# Patient Record
Sex: MALE | Race: WHITE | NOT HISPANIC OR LATINO | Employment: FULL TIME | URBAN - NONMETROPOLITAN AREA
[De-identification: names, ages, dates, MRNs, and addresses within clinical notes are randomized per-mention and may not be internally consistent; named-entity substitution may affect disease eponyms.]

---

## 2017-10-20 ENCOUNTER — ANESTHESIA (OUTPATIENT)
Dept: SURGERY | Facility: HOSPITAL | Age: 42
End: 2017-10-20
Payer: COMMERCIAL

## 2017-10-20 ENCOUNTER — HOSPITAL ENCOUNTER (OUTPATIENT)
Facility: HOSPITAL | Age: 42
Discharge: HOME | End: 2017-10-21
Attending: EMERGENCY MEDICINE | Admitting: SURGERY
Payer: COMMERCIAL

## 2017-10-20 ENCOUNTER — ANESTHESIA EVENT (OUTPATIENT)
Dept: SURGERY | Facility: HOSPITAL | Age: 42
End: 2017-10-20
Payer: COMMERCIAL

## 2017-10-20 ENCOUNTER — APPOINTMENT (OUTPATIENT)
Dept: GENERAL RADIOLOGY | Facility: HOSPITAL | Age: 42
End: 2017-10-20
Attending: SURGERY
Payer: COMMERCIAL

## 2017-10-20 ENCOUNTER — APPOINTMENT (OUTPATIENT)
Dept: GENERAL RADIOLOGY | Facility: HOSPITAL | Age: 42
End: 2017-10-20
Attending: EMERGENCY MEDICINE
Payer: COMMERCIAL

## 2017-10-20 ENCOUNTER — SURGERY (OUTPATIENT)
Age: 42
End: 2017-10-20

## 2017-10-20 DIAGNOSIS — S91.312A FOOT LACERATION, LEFT, INITIAL ENCOUNTER: Primary | ICD-10-CM

## 2017-10-20 DIAGNOSIS — T14.8XXA: ICD-10-CM

## 2017-10-20 LAB
ANION GAP SERPL CALCULATED.3IONS-SCNC: 9 MMOL/L (ref 3–14)
BASOPHILS # BLD AUTO: 0 10E9/L (ref 0–0.2)
BASOPHILS NFR BLD AUTO: 0.2 %
BUN SERPL-MCNC: 13 MG/DL (ref 7–30)
CALCIUM SERPL-MCNC: 9.1 MG/DL (ref 8.5–10.1)
CHLORIDE SERPL-SCNC: 103 MMOL/L (ref 94–109)
CO2 SERPL-SCNC: 27 MMOL/L (ref 20–32)
CREAT SERPL-MCNC: 1 MG/DL (ref 0.66–1.25)
DIFFERENTIAL METHOD BLD: ABNORMAL
EOSINOPHIL # BLD AUTO: 0.2 10E9/L (ref 0–0.7)
EOSINOPHIL NFR BLD AUTO: 1.7 %
ERYTHROCYTE [DISTWIDTH] IN BLOOD BY AUTOMATED COUNT: 12.1 % (ref 10–15)
GFR SERPL CREATININE-BSD FRML MDRD: 82 ML/MIN/1.7M2
GLUCOSE SERPL-MCNC: 106 MG/DL (ref 70–99)
HCT VFR BLD AUTO: 41.6 % (ref 40–53)
HGB BLD-MCNC: 14.6 G/DL (ref 13.3–17.7)
IMM GRANULOCYTES # BLD: 0 10E9/L (ref 0–0.4)
IMM GRANULOCYTES NFR BLD: 0.3 %
LYMPHOCYTES # BLD AUTO: 1.9 10E9/L (ref 0.8–5.3)
LYMPHOCYTES NFR BLD AUTO: 15.9 %
MCH RBC QN AUTO: 31.5 PG (ref 26.5–33)
MCHC RBC AUTO-ENTMCNC: 35.1 G/DL (ref 31.5–36.5)
MCV RBC AUTO: 90 FL (ref 78–100)
MONOCYTES # BLD AUTO: 1 10E9/L (ref 0–1.3)
MONOCYTES NFR BLD AUTO: 8.4 %
NEUTROPHILS # BLD AUTO: 8.7 10E9/L (ref 1.6–8.3)
NEUTROPHILS NFR BLD AUTO: 73.5 %
NRBC # BLD AUTO: 0 10*3/UL
NRBC BLD AUTO-RTO: 0 /100
PLATELET # BLD AUTO: 273 10E9/L (ref 150–450)
POTASSIUM SERPL-SCNC: 3.8 MMOL/L (ref 3.4–5.3)
RBC # BLD AUTO: 4.63 10E12/L (ref 4.4–5.9)
SODIUM SERPL-SCNC: 139 MMOL/L (ref 133–144)
WBC # BLD AUTO: 11.8 10E9/L (ref 4–11)

## 2017-10-20 PROCEDURE — 36415 COLL VENOUS BLD VENIPUNCTURE: CPT | Performed by: EMERGENCY MEDICINE

## 2017-10-20 PROCEDURE — 37000008 ZZH ANESTHESIA TECHNICAL FEE, 1ST 30 MIN: Performed by: SURGERY

## 2017-10-20 PROCEDURE — 27110028 ZZH OR GENERAL SUPPLY NON-STERILE: Performed by: SURGERY

## 2017-10-20 PROCEDURE — 25000125 ZZHC RX 250: Performed by: SURGERY

## 2017-10-20 PROCEDURE — 25000125 ZZHC RX 250: Performed by: NURSE ANESTHETIST, CERTIFIED REGISTERED

## 2017-10-20 PROCEDURE — 40000985 XR FOOT LT G/E 3 VW: Mod: TC,LT

## 2017-10-20 PROCEDURE — S0020 INJECTION, BUPIVICAINE HYDRO: HCPCS | Performed by: SURGERY

## 2017-10-20 PROCEDURE — 73630 X-RAY EXAM OF FOOT: CPT | Mod: TC,LT

## 2017-10-20 PROCEDURE — 99285 EMERGENCY DEPT VISIT HI MDM: CPT | Mod: 25

## 2017-10-20 PROCEDURE — 28208 REPAIR OF FOOT TENDON: CPT | Performed by: SURGERY

## 2017-10-20 PROCEDURE — 25000132 ZZH RX MED GY IP 250 OP 250 PS 637: Performed by: SURGERY

## 2017-10-20 PROCEDURE — 71000014 ZZH RECOVERY PHASE 1 LEVEL 2 FIRST HR: Performed by: SURGERY

## 2017-10-20 PROCEDURE — 40000275 ZZH STATISTIC RCP TIME EA 10 MIN

## 2017-10-20 PROCEDURE — 36000052 ZZH SURGERY LEVEL 2 EA 15 ADDTL MIN: Performed by: SURGERY

## 2017-10-20 PROCEDURE — 25000128 H RX IP 250 OP 636: Performed by: NURSE ANESTHETIST, CERTIFIED REGISTERED

## 2017-10-20 PROCEDURE — 25000128 H RX IP 250 OP 636: Performed by: SURGERY

## 2017-10-20 PROCEDURE — 37000009 ZZH ANESTHESIA TECHNICAL FEE, EACH ADDTL 15 MIN: Performed by: SURGERY

## 2017-10-20 PROCEDURE — 85025 COMPLETE CBC W/AUTO DIFF WBC: CPT | Performed by: EMERGENCY MEDICINE

## 2017-10-20 PROCEDURE — 11044 DBRDMT BONE 1ST 20 SQ CM/<: CPT | Performed by: SURGERY

## 2017-10-20 PROCEDURE — 99283 EMERGENCY DEPT VISIT LOW MDM: CPT | Performed by: EMERGENCY MEDICINE

## 2017-10-20 PROCEDURE — 27210794 ZZH OR GENERAL SUPPLY STERILE: Performed by: SURGERY

## 2017-10-20 PROCEDURE — 80048 BASIC METABOLIC PNL TOTAL CA: CPT | Performed by: EMERGENCY MEDICINE

## 2017-10-20 PROCEDURE — 01999 UNLISTED ANES PROCEDURE: CPT | Performed by: NURSE ANESTHETIST, CERTIFIED REGISTERED

## 2017-10-20 PROCEDURE — 96372 THER/PROPH/DIAG INJ SC/IM: CPT

## 2017-10-20 PROCEDURE — 25000128 H RX IP 250 OP 636

## 2017-10-20 PROCEDURE — 36000050 ZZH SURGERY LEVEL 2 1ST 30 MIN: Performed by: SURGERY

## 2017-10-20 PROCEDURE — 12001 RPR S/N/AX/GEN/TRNK 2.5CM/<: CPT | Mod: 59 | Performed by: SURGERY

## 2017-10-20 RX ORDER — ACETAMINOPHEN 325 MG/1
650 TABLET ORAL EVERY 6 HOURS PRN
Status: DISCONTINUED | OUTPATIENT
Start: 2017-10-20 | End: 2017-10-21 | Stop reason: HOSPADM

## 2017-10-20 RX ORDER — CEFAZOLIN SODIUM 1 G/3ML
INJECTION, POWDER, FOR SOLUTION INTRAMUSCULAR; INTRAVENOUS PRN
Status: DISCONTINUED | OUTPATIENT
Start: 2017-10-20 | End: 2017-10-20

## 2017-10-20 RX ORDER — KETOROLAC TROMETHAMINE 30 MG/ML
INJECTION, SOLUTION INTRAMUSCULAR; INTRAVENOUS
Status: COMPLETED
Start: 2017-10-20 | End: 2017-10-20

## 2017-10-20 RX ORDER — ONDANSETRON 4 MG/1
4 TABLET, ORALLY DISINTEGRATING ORAL EVERY 30 MIN PRN
Status: DISCONTINUED | OUTPATIENT
Start: 2017-10-20 | End: 2017-10-20

## 2017-10-20 RX ORDER — SODIUM CHLORIDE, SODIUM LACTATE, POTASSIUM CHLORIDE, CALCIUM CHLORIDE 600; 310; 30; 20 MG/100ML; MG/100ML; MG/100ML; MG/100ML
INJECTION, SOLUTION INTRAVENOUS CONTINUOUS
Status: DISCONTINUED | OUTPATIENT
Start: 2017-10-20 | End: 2017-10-20

## 2017-10-20 RX ORDER — MEPERIDINE HYDROCHLORIDE 25 MG/ML
12.5 INJECTION INTRAMUSCULAR; INTRAVENOUS; SUBCUTANEOUS
Status: DISCONTINUED | OUTPATIENT
Start: 2017-10-20 | End: 2017-10-20 | Stop reason: HOSPADM

## 2017-10-20 RX ORDER — FENTANYL CITRATE 50 UG/ML
25-50 INJECTION, SOLUTION INTRAMUSCULAR; INTRAVENOUS
Status: DISCONTINUED | OUTPATIENT
Start: 2017-10-20 | End: 2017-10-20 | Stop reason: HOSPADM

## 2017-10-20 RX ORDER — NALOXONE HYDROCHLORIDE 0.4 MG/ML
.1-.4 INJECTION, SOLUTION INTRAMUSCULAR; INTRAVENOUS; SUBCUTANEOUS
Status: DISCONTINUED | OUTPATIENT
Start: 2017-10-20 | End: 2017-10-21 | Stop reason: HOSPADM

## 2017-10-20 RX ORDER — LABETALOL HYDROCHLORIDE 5 MG/ML
10 INJECTION, SOLUTION INTRAVENOUS
Status: DISCONTINUED | OUTPATIENT
Start: 2017-10-20 | End: 2017-10-20 | Stop reason: HOSPADM

## 2017-10-20 RX ORDER — MORPHINE SULFATE 2 MG/ML
2-4 INJECTION, SOLUTION INTRAMUSCULAR; INTRAVENOUS EVERY 10 MIN PRN
Status: DISCONTINUED | OUTPATIENT
Start: 2017-10-20 | End: 2017-10-20 | Stop reason: HOSPADM

## 2017-10-20 RX ORDER — METOCLOPRAMIDE 10 MG/1
10 TABLET ORAL EVERY 6 HOURS PRN
Status: DISCONTINUED | OUTPATIENT
Start: 2017-10-20 | End: 2017-10-21 | Stop reason: HOSPADM

## 2017-10-20 RX ORDER — DEXAMETHASONE SODIUM PHOSPHATE 4 MG/ML
4 INJECTION, SOLUTION INTRA-ARTICULAR; INTRALESIONAL; INTRAMUSCULAR; INTRAVENOUS; SOFT TISSUE
Status: DISCONTINUED | OUTPATIENT
Start: 2017-10-20 | End: 2017-10-20

## 2017-10-20 RX ORDER — PROPOFOL 10 MG/ML
INJECTION, EMULSION INTRAVENOUS PRN
Status: DISCONTINUED | OUTPATIENT
Start: 2017-10-20 | End: 2017-10-20

## 2017-10-20 RX ORDER — FENTANYL CITRATE 50 UG/ML
INJECTION, SOLUTION INTRAMUSCULAR; INTRAVENOUS PRN
Status: DISCONTINUED | OUTPATIENT
Start: 2017-10-20 | End: 2017-10-20

## 2017-10-20 RX ORDER — NALOXONE HYDROCHLORIDE 0.4 MG/ML
.1-.4 INJECTION, SOLUTION INTRAMUSCULAR; INTRAVENOUS; SUBCUTANEOUS
Status: DISCONTINUED | OUTPATIENT
Start: 2017-10-20 | End: 2017-10-20 | Stop reason: HOSPADM

## 2017-10-20 RX ORDER — MORPHINE SULFATE 2 MG/ML
2-4 INJECTION, SOLUTION INTRAMUSCULAR; INTRAVENOUS EVERY 5 MIN PRN
Status: DISCONTINUED | OUTPATIENT
Start: 2017-10-20 | End: 2017-10-20

## 2017-10-20 RX ORDER — ONDANSETRON 2 MG/ML
4 INJECTION INTRAMUSCULAR; INTRAVENOUS EVERY 30 MIN PRN
Status: DISCONTINUED | OUTPATIENT
Start: 2017-10-20 | End: 2017-10-20

## 2017-10-20 RX ORDER — ONDANSETRON 4 MG/1
4 TABLET, ORALLY DISINTEGRATING ORAL EVERY 30 MIN PRN
Status: DISCONTINUED | OUTPATIENT
Start: 2017-10-20 | End: 2017-10-20 | Stop reason: HOSPADM

## 2017-10-20 RX ORDER — OXYCODONE HYDROCHLORIDE 5 MG/1
5 TABLET ORAL EVERY 4 HOURS PRN
Status: DISCONTINUED | OUTPATIENT
Start: 2017-10-20 | End: 2017-10-21 | Stop reason: HOSPADM

## 2017-10-20 RX ORDER — ONDANSETRON 4 MG/1
4 TABLET, ORALLY DISINTEGRATING ORAL EVERY 6 HOURS PRN
Status: DISCONTINUED | OUTPATIENT
Start: 2017-10-20 | End: 2017-10-21 | Stop reason: HOSPADM

## 2017-10-20 RX ORDER — SODIUM CHLORIDE, SODIUM LACTATE, POTASSIUM CHLORIDE, CALCIUM CHLORIDE 600; 310; 30; 20 MG/100ML; MG/100ML; MG/100ML; MG/100ML
INJECTION, SOLUTION INTRAVENOUS CONTINUOUS PRN
Status: DISCONTINUED | OUTPATIENT
Start: 2017-10-20 | End: 2017-10-20

## 2017-10-20 RX ORDER — BUPIVACAINE HYDROCHLORIDE 2.5 MG/ML
INJECTION, SOLUTION INFILTRATION; PERINEURAL PRN
Status: DISCONTINUED | OUTPATIENT
Start: 2017-10-20 | End: 2017-10-20 | Stop reason: HOSPADM

## 2017-10-20 RX ORDER — PROCHLORPERAZINE MALEATE 5 MG
5-10 TABLET ORAL EVERY 6 HOURS PRN
Status: DISCONTINUED | OUTPATIENT
Start: 2017-10-20 | End: 2017-10-21 | Stop reason: HOSPADM

## 2017-10-20 RX ORDER — METOCLOPRAMIDE HYDROCHLORIDE 5 MG/ML
10 INJECTION INTRAMUSCULAR; INTRAVENOUS EVERY 6 HOURS PRN
Status: DISCONTINUED | OUTPATIENT
Start: 2017-10-20 | End: 2017-10-21 | Stop reason: HOSPADM

## 2017-10-20 RX ORDER — ONDANSETRON 2 MG/ML
4 INJECTION INTRAMUSCULAR; INTRAVENOUS EVERY 6 HOURS PRN
Status: DISCONTINUED | OUTPATIENT
Start: 2017-10-20 | End: 2017-10-21 | Stop reason: HOSPADM

## 2017-10-20 RX ORDER — ONDANSETRON 2 MG/ML
4 INJECTION INTRAMUSCULAR; INTRAVENOUS EVERY 30 MIN PRN
Status: DISCONTINUED | OUTPATIENT
Start: 2017-10-20 | End: 2017-10-20 | Stop reason: HOSPADM

## 2017-10-20 RX ORDER — KETOROLAC TROMETHAMINE 30 MG/ML
30 INJECTION, SOLUTION INTRAMUSCULAR; INTRAVENOUS ONCE
Status: COMPLETED | OUTPATIENT
Start: 2017-10-20 | End: 2017-10-20

## 2017-10-20 RX ORDER — PROPOFOL 10 MG/ML
INJECTION, EMULSION INTRAVENOUS CONTINUOUS PRN
Status: DISCONTINUED | OUTPATIENT
Start: 2017-10-20 | End: 2017-10-20

## 2017-10-20 RX ORDER — ONDANSETRON 2 MG/ML
INJECTION INTRAMUSCULAR; INTRAVENOUS
Status: COMPLETED
Start: 2017-10-20 | End: 2017-10-20

## 2017-10-20 RX ORDER — KETOROLAC TROMETHAMINE 30 MG/ML
30 INJECTION, SOLUTION INTRAMUSCULAR; INTRAVENOUS EVERY 6 HOURS PRN
Status: DISCONTINUED | OUTPATIENT
Start: 2017-10-20 | End: 2017-10-20 | Stop reason: HOSPADM

## 2017-10-20 RX ORDER — DEXAMETHASONE SODIUM PHOSPHATE 10 MG/ML
INJECTION, SOLUTION INTRAMUSCULAR; INTRAVENOUS PRN
Status: DISCONTINUED | OUTPATIENT
Start: 2017-10-20 | End: 2017-10-20

## 2017-10-20 RX ORDER — MEPERIDINE HYDROCHLORIDE 25 MG/ML
12.5 INJECTION INTRAMUSCULAR; INTRAVENOUS; SUBCUTANEOUS EVERY 5 MIN PRN
Status: DISCONTINUED | OUTPATIENT
Start: 2017-10-20 | End: 2017-10-20

## 2017-10-20 RX ORDER — ONDANSETRON 2 MG/ML
INJECTION INTRAMUSCULAR; INTRAVENOUS PRN
Status: DISCONTINUED | OUTPATIENT
Start: 2017-10-20 | End: 2017-10-20

## 2017-10-20 RX ORDER — FENTANYL CITRATE 50 UG/ML
INJECTION, SOLUTION INTRAMUSCULAR; INTRAVENOUS
Status: DISCONTINUED
Start: 2017-10-20 | End: 2017-10-20 | Stop reason: WASHOUT

## 2017-10-20 RX ORDER — MORPHINE SULFATE 2 MG/ML
2-4 INJECTION, SOLUTION INTRAMUSCULAR; INTRAVENOUS
Status: DISCONTINUED | OUTPATIENT
Start: 2017-10-20 | End: 2017-10-21 | Stop reason: HOSPADM

## 2017-10-20 RX ORDER — DEXAMETHASONE SODIUM PHOSPHATE 4 MG/ML
4 INJECTION, SOLUTION INTRA-ARTICULAR; INTRALESIONAL; INTRAMUSCULAR; INTRAVENOUS; SOFT TISSUE EVERY 10 MIN PRN
Status: DISCONTINUED | OUTPATIENT
Start: 2017-10-20 | End: 2017-10-20 | Stop reason: HOSPADM

## 2017-10-20 RX ADMIN — PROPOFOL 75 MCG/KG/MIN: 10 INJECTION, EMULSION INTRAVENOUS at 18:28

## 2017-10-20 RX ADMIN — PROPOFOL 30 MG: 10 INJECTION, EMULSION INTRAVENOUS at 18:12

## 2017-10-20 RX ADMIN — ONDANSETRON 4 MG: 2 INJECTION INTRAMUSCULAR; INTRAVENOUS at 18:48

## 2017-10-20 RX ADMIN — PROPOFOL 20 MG: 10 INJECTION, EMULSION INTRAVENOUS at 18:14

## 2017-10-20 RX ADMIN — DEXAMETHASONE SODIUM PHOSPHATE 10 MG: 10 INJECTION, SOLUTION INTRAMUSCULAR; INTRAVENOUS at 18:30

## 2017-10-20 RX ADMIN — TAZOBACTAM SODIUM AND PIPERACILLIN SODIUM 3.38 G: 375; 3 INJECTION, SOLUTION INTRAVENOUS at 21:29

## 2017-10-20 RX ADMIN — ACETAMINOPHEN 650 MG: 325 TABLET, FILM COATED ORAL at 21:42

## 2017-10-20 RX ADMIN — PROPOFOL 20 MG: 10 INJECTION, EMULSION INTRAVENOUS at 18:13

## 2017-10-20 RX ADMIN — PROPOFOL 20 MG: 10 INJECTION, EMULSION INTRAVENOUS at 18:18

## 2017-10-20 RX ADMIN — PROPOFOL 20 MG: 10 INJECTION, EMULSION INTRAVENOUS at 18:24

## 2017-10-20 RX ADMIN — SODIUM CHLORIDE, POTASSIUM CHLORIDE, SODIUM LACTATE AND CALCIUM CHLORIDE: 600; 310; 30; 20 INJECTION, SOLUTION INTRAVENOUS at 18:00

## 2017-10-20 RX ADMIN — KETOROLAC TROMETHAMINE 30 MG: 30 INJECTION, SOLUTION INTRAMUSCULAR; INTRAVENOUS at 16:51

## 2017-10-20 RX ADMIN — BUPIVACAINE HYDROCHLORIDE 20 ML: 2.5 INJECTION, SOLUTION INFILTRATION; PERINEURAL at 18:36

## 2017-10-20 RX ADMIN — MIDAZOLAM HYDROCHLORIDE 2 MG: 1 INJECTION, SOLUTION INTRAMUSCULAR; INTRAVENOUS at 18:04

## 2017-10-20 RX ADMIN — PROPOFOL 20 MG: 10 INJECTION, EMULSION INTRAVENOUS at 18:16

## 2017-10-20 RX ADMIN — PROPOFOL 20 MG: 10 INJECTION, EMULSION INTRAVENOUS at 18:20

## 2017-10-20 RX ADMIN — PROPOFOL 20 MG: 10 INJECTION, EMULSION INTRAVENOUS at 18:22

## 2017-10-20 RX ADMIN — FENTANYL CITRATE 100 MCG: 50 INJECTION, SOLUTION INTRAMUSCULAR; INTRAVENOUS at 18:04

## 2017-10-20 RX ADMIN — CEFAZOLIN 2 G: 1 INJECTION, POWDER, FOR SOLUTION INTRAMUSCULAR; INTRAVENOUS at 18:14

## 2017-10-20 RX ADMIN — KETOROLAC TROMETHAMINE 30 MG: 30 INJECTION, SOLUTION INTRAMUSCULAR at 16:51

## 2017-10-20 RX ADMIN — PROPOFOL 20 MG: 10 INJECTION, EMULSION INTRAVENOUS at 18:15

## 2017-10-20 ASSESSMENT — LIFESTYLE VARIABLES: TOBACCO_USE: 1

## 2017-10-20 ASSESSMENT — PAIN DESCRIPTION - DESCRIPTORS: DESCRIPTORS: ACHING

## 2017-10-20 NOTE — IP AVS SNAPSHOT
` `     [facility]: [phone]                 INTERAGENCY TRANSFER FORM - NOTES (H&P, Discharge Summary, Consults, Procedures, Therapies)   10/20/2017                    Hospital Admission Date: 10/20/2017  ZIGGY DUDLEY   : 1975  Sex: Male        Patient PCP Information     Provider PCP Type    Efrain Kang MD General      History & Physicals     No notes of this type exist for this encounter.         Discharge Summaries      Discharge Summaries by Rene William DO at 10/21/2017  8:01 AM     Author:  Rene William DO Service:  Surgery Author Type:  Physician    Filed:  10/21/2017  8:03 AM Date of Service:  10/21/2017  8:01 AM Creation Time:  10/21/2017  8:01 AM    Status:  Signed :  Rene William DO (Physician)         Physician Discharge Summary     Patient ID:  Ziggy Dudley  [id number]  42 year old  1975    Admit date: 10/20/2017    Discharge date and time: 10/21/2017     Admitting Physician: Rene William DO     Discharge Physician: Rene William DO    Admission Diagnoses: Foot laceration, left, initial encounter [S91.312A]  Compound fracture [T14.8XXA]    Discharge Diagnoses: Same    Admission Condition: fair    Discharged Condition: good    Indication for Admission: IV antibiotics    Hospital Course: uncomplicated    Consults: Orthopedic surgery    Significant Diagnostic Studies: radiology: X-Ray: Left foot:   PROCEDURE:  XR FOOT LT G/E 3 VW     HISTORY: compound fracture     COMPARISON:  10/20/2017     TECHNIQUE:  3 views of the left foot were obtained.     FINDINGS:  No fracture or dislocation is identified. The joint spaces  are preserved.           IMPRESSION: No acute fracture.       HOLLAND CASTILLO MD         Treatments: antibiotics: Zosyn and surgery: Irrigation and debridement    Discharge Exam:  Temp: 98.5  F (36.9  C) Temp  Min: 96.9  F (36.1  C)  Max: 98.8  F (37.1  C)  Resp: 18 Resp  Min: 16  Max: 18  SpO2: 95 % SpO2  Min: 94  %  Max: 97 %  Heart Rate: 71 Heart Rate  Min: 63  Max: 103  BP: 117/78 Systolic (24hrs), Av , Min:108 , Max:152   Diastolic (24hrs), Av, Min:71, Max:100  Gen: sleeping, easily awaken, NAD  Wound: surgical dressing in place with no seepage       Disposition: home    Patient Instructions:   Current Discharge Medication List      CONTINUE these medications which have NOT CHANGED    Details   SIMVASTATIN PO Take 80 mg by mouth daily      Acetaminophen (TYLENOL PO) Take by mouth every 6 hours as needed for mild pain or fever      IBUPROFEN PO Take by mouth every 6 hours as needed for moderate pain           Activity: activity as tolerated, no driving while on analgesics and partial weight bearing left leg  Diet: regular diet  Wound Care: keep wound clean and dry and reinforce dressing PRN    Follow-up with Dr. William in 1 week.    Signed:  Rene William  10/21/2017  8:01 AM[MW1.1]     Revision History        User Key Date/Time User Provider Type Action    > MW1.1 10/21/2017  8:03 AM Rene William DO Physician Sign                     Consult Notes      Consults by Rene William DO at 10/20/2017  5:03 PM     Author:  Rene William DO Service:  Surgery Author Type:  Physician    Filed:  10/20/2017  5:19 PM Date of Service:  10/20/2017  5:03 PM Creation Time:  10/20/2017  5:03 PM    Status:  Signed :  Rene William DO (Physician)         Surgery Consult Clinic Note      RE: Ricci Lawrence  : 1975  YULY: 10/20/2017      Chief Complaint:  Left 4th toe laceration    History of Present Illness:  Mr. Lawrence is a very pleasant 42 year old year old male who I am seeing at the request of Dr. Coronel of the emergency department for evaluation of laceration of the left 4th toe and to consider surgical debridement.  Was cutting wood with a chainsaw this afternoon when his foot slipped and was cut with the chain saw.  3 view xray did not demonstrate any bony fracture.    Berna is requesting surgical debridement given the mechanism and the fact there's bone exposed in the wound.  Patient works as an  and is on his feet every day and reliant on being ambulatory.  Denies smoking.  No cardiovascular disease.  Has had anesthesia in the past with no issues.   He specifically denies fever, chills, nausea, vomiting, chest pain, shortness of breath or palpitations.      Medical history:  Hyperlipidemia    Surgical history:  No past surgical history on file.    Family history:  Non contributory     Medications:  Current Outpatient Prescriptions   Medication Sig Dispense Refill     SIMVASTATIN PO Take 80 mg by mouth daily       Acetaminophen (TYLENOL PO) Take by mouth every 6 hours as needed for mild pain or fever       IBUPROFEN PO Take by mouth every 6 hours as needed for moderate pain       Allergies:  The patienthas No Known Allergies.  .  Social history:  Social History   Substance Use Topics     Smoking status: Not on file     Smokeless tobacco: Not on file     Alcohol use Not on file     Marital status: .    Review of Systems:    Constitutional: Negative for fever, chills and weight loss.   HENT: Negative for ear pain, nosebleeds, congestion, sore throat, tinnitus and ear discharge.    Eyes: Negative for blurred vision, double vision, photophobia and pain.   Respiratory: Negative for cough, hemoptysis, shortness of breath, wheezing and stridor.    Cardiovascular: Negative for chest pain, palpitations and orthopnea.   Gastrointestinal: Negative for heartburn, nausea, vomiting, abdominal pain and blood in stool.   Genitourinary: Negative for urgency, frequency and hematuria.   Musculoskeletal: Negative for myalgias, back pain and joint pain.   Neurological: Negative for tingling, speech change and headaches.   Endo/Heme/Allergies: Does not bruise/bleed easily.   Psychiatric/Behavioral: Negative for depression, suicidal ideas and hallucinations. The patient is not  nervous/anxious.    Physical Examination:  /99  Temp 98.8  F (37.1  C) (Tympanic)  Resp 18  SpO2 94%  General: AAOx4, NAD, WN/WD, ambulating without assistance  HEENT:NCAT, EOMI, PERRL Sclerae anicteric; Trachea mideline, no JVD  Chest:   Clear to auscultation bilaterally.  Cardiac: S1S2 , regular rate and rhythm without additional sounds  Abdomen: Soft, ND/NT no rebound, no guarding  Extremities: Cursory exam unremarkable. Left foot, dorsum aspect, across the DIP a oblique laceration with significant separation of the skin edges with bone visible in the wound.  5/5 MS, able to wiggle toes, sensation intact  Skin: Warm, dry, < 2 sec cap refill  Neuro: CN 2-12 grossly intact, no focal deficit, GCS 15  Psych: happy, calm, asks appropriate questions[MW1.1]    Results for orders placed or performed during the hospital encounter of 10/20/17   Foot XR, G/E 3 views, left    Narrative    PROCEDURE: XR FOOT LT G/E 3 VW 10/20/2017 3:35 PM    HISTORY: pain    COMPARISONS: None.    TECHNIQUE: 3 views.    FINDINGS: No fracture, dislocation or other focal bony abnormality is  seen.    There is a small spur at the insertion site of the Achilles tendon.         Impression    IMPRESSION: Calcaneal spur.    GENEVA JONES MD   Basic metabolic panel   Result Value Ref Range    Sodium 139 133 - 144 mmol/L    Potassium 3.8 3.4 - 5.3 mmol/L    Chloride 103 94 - 109 mmol/L    Carbon Dioxide 27 20 - 32 mmol/L    Anion Gap 9 3 - 14 mmol/L    Glucose 106 (H) 70 - 99 mg/dL    Urea Nitrogen 13 7 - 30 mg/dL    Creatinine 1.00 0.66 - 1.25 mg/dL    GFR Estimate 82 >60 mL/min/1.7m2    GFR Estimate If Black >90 >60 mL/min/1.7m2    Calcium 9.1 8.5 - 10.1 mg/dL   CBC with platelets differential   Result Value Ref Range    WBC 11.8 (H) 4.0 - 11.0 10e9/L    RBC Count 4.63 4.4 - 5.9 10e12/L    Hemoglobin 14.6 13.3 - 17.7 g/dL    Hematocrit 41.6 40.0 - 53.0 %    MCV 90 78 - 100 fl    MCH 31.5 26.5 - 33.0 pg    MCHC 35.1 31.5 - 36.5 g/dL     RDW 12.1 10.0 - 15.0 %    Platelet Count 273 150 - 450 10e9/L    Diff Method Automated Method     % Neutrophils 73.5 %    % Lymphocytes 15.9 %    % Monocytes 8.4 %    % Eosinophils 1.7 %    % Basophils 0.2 %    % Immature Granulocytes 0.3 %    Nucleated RBCs 0 0 /100    Absolute Neutrophil 8.7 (H) 1.6 - 8.3 10e9/L    Absolute Lymphocytes 1.9 0.8 - 5.3 10e9/L    Absolute Monocytes 1.0 0.0 - 1.3 10e9/L    Absolute Eosinophils 0.2 0.0 - 0.7 10e9/L    Absolute Basophils 0.0 0.0 - 0.2 10e9/L    Abs Immature Granulocytes 0.0 0 - 0.4 10e9/L    Absolute Nucleated RBC 0.0[MW1.2]        Assessment/Plan:  #1 Chainsaw accident  #2 Left 4th and 3rd toe laceration    Patient stable, but is in a profession where is feet are valuable.  The bone is exposed and given the environment and mechanism I believe that we should debride this surgically and attempt at closure.  The patient did eat, but we can explore the wound under local anesthetic with IV sedation.  Patient is agreeable with this plan.          Dr William  [facility]  [address], [address]  [place] [address]    Referring Provider:  No referring provider defined for this encounter.     Primary Care Provider:  Efrain Larson[MW1.1]     Revision History        User Key Date/Time User Provider Type Action    > MW1.2 10/20/2017  5:19 PM Rene William DO Physician Sign     MW1.1 10/20/2017  5:03 PM Rene William DO Physician                      Progress Notes - Physician (Notes from 10/18/17 through 10/21/17)      Progress Notes by Rene William DO at 10/21/2017  7:49 AM     Author:  Rene William DO Service:  Surgery Author Type:  Physician    Filed:  10/21/2017  8:00 AM Date of Service:  10/21/2017  7:49 AM Creation Time:  10/21/2017  7:49 AM    Status:  Signed :  Rene William DO (Physician)         S: POD #1 Irrigation and debridement of Left 3rd and 4th toe laceration with  chainsaw.  No overnight events, no acute issues, hemodynamically stable, afebrile, denies pain.  Spoke with orthopedics oncall at [facility] who recommended antibiotics and no further surgical management needed.    O:[MW1.1]  Temp: 98.5  F (36.9  C) Temp  Min: 96.9  F (36.1  C)  Max: 98.8  F (37.1  C)  Resp: 18 Resp  Min: 16  Max: 18  SpO2: 95 % SpO2  Min: 94 %  Max: 97 %  Heart Rate: 71 Heart Rate  Min: 63  Max: 103  BP: 117/78 Systolic (24hrs), Av , Min:108 , Max:152   Diastolic (24hrs), Av, Min:71, Max:100[MW1.2]  Gen: sleeping, easily awaken, NAD  Wound: surgical dressing in place with no seepage    A/P  #1 Chainsaw laceration left 3rd and 4th toe    Received appropriate antibiotics.  Pain controlled.  Okay to discharge.[MW1.1]     Revision History        User Key Date/Time User Provider Type Action    > MW1.2 10/21/2017  8:00 AM Rene William DO Physician Sign     MW1.1 10/21/2017  7:49 AM Rene William DO Physician             ED Provider Notes by Xavier Coronel MD at 10/20/2017  3:00 PM     Author:  Xavier Coronel MD Service:  Emergency Medicine Author Type:  Physician    Filed:  10/20/2017  5:19 PM Date of Service:  10/20/2017  3:00 PM Creation Time:  10/20/2017  3:30 PM    Status:  Signed :  Xavier Coronel MD (Physician)           History[EM1.1]     Chief Complaint   Patient presents with     Laceration     chain saw lac to lt middle and 4th toe[EM1.2]     HPI  Ricci Lawrence is a 42 year old male who presents to the emergency department to be evaluated for laceration on the left foot.  Patient accidentally sustained a laceration on the left foot from a chainsaw.  He was trying to split some firewood and the chainsaw accidentally landed on his left foot dorsal surface sustaining a deep laceration on the third and fourth toes.  Bleeding was controlled with direct pressure.  His last tetanus shot was in .    Problem List:[EM1.1]    There are no active problems to  display for this patient.[EM1.2]       Past Medical History:[EM1.1]    No past medical history on file.[EM1.2]    Past Surgical History:[EM1.1]    No past surgical history on file.[EM1.2]    Family History:[EM1.1]    No family history on file.[EM1.2]    Social History:  Marital Status:   [2][EM1.1]  Social History   Substance Use Topics     Smoking status: Not on file     Smokeless tobacco: Not on file     Alcohol use Not on file[EM1.2]        Medications:[EM1.1]      SIMVASTATIN PO   Acetaminophen (TYLENOL PO)   IBUPROFEN PO[EM1.2]         Review of Systems   All other systems reviewed and are negative.      Physical Exam[EM1.1]   BP: 152/99  Heart Rate: 103  Temp: 98.8  F (37.1  C)  Resp: 18  SpO2: 94 %[EM1.2]      Physical Exam   Constitutional: He is oriented to person, place, and time. He appears well-developed and well-nourished. No distress.   HENT:   Head: Atraumatic.   Mouth/Throat: Oropharynx is clear and moist. No oropharyngeal exudate.   Eyes: Pupils are equal, round, and reactive to light. No scleral icterus.   Cardiovascular: Normal rate, regular rhythm, normal heart sounds and intact distal pulses.    Pulmonary/Chest: Breath sounds normal. No respiratory distress. He has no wheezes. He has no rales.   Abdominal: Soft. Bowel sounds are normal. There is no tenderness. There is no rebound and no guarding.   Musculoskeletal: He exhibits no edema or tenderness.        Feet:[EM1.1]  [EM1.3]  Neurological: He is alert and oriented to person, place, and time.   Skin: Skin is warm. No rash noted. He is not diaphoretic.   Nursing note and vitals reviewed.      ED Course[EM1.1]   Patient evaluated and laboratory tests ordered.  X-ray of the left foot ordered.[EM1.4]  4:35 PM: Dr. William (general surgeon on-call) consulted to come and review the laceration for possible surgical toilet in the OR.[EM1.5]    ED Course[EM1.2]     Procedures[EM1.1]         Labs Ordered and Resulted from Time of ED Arrival Up  to the Time of Departure from the ED   BASIC METABOLIC PANEL - Abnormal; Notable for the following:        Result Value    Glucose 106 (*)     All other components within normal limits   CBC WITH PLATELETS DIFFERENTIAL - Abnormal; Notable for the following:     WBC 11.8 (*)     Absolute Neutrophil 8.7 (*)     All other components within normal limits[EM1.2]       Assessments & Plan (with Medical Decision Making)[EM1.1]   Laceration the dorsal surface of left foot: X-rays are negative for any fractures. Patient discharged to the operating room under the care of Dr. William.  Discharge home on crutches.  All questions answered.  Follow-up with PCP on Monday.[EM1.3]    I have reviewed the nursing notes.    I have reviewed the findings, diagnosis, plan and need for follow up with the patient.[EM1.1]    New Prescriptions    No medications on file       Final diagnoses:   Foot laceration, left, initial encounter[EM1.2]       10/20/2017   [facility][EM1.1]     Xavier Coronel MD  10/20/17 1719  [EM1.2]     Revision History        User Key Date/Time User Provider Type Action    > EM1.2 10/20/2017  5:19 PM Xavier Coronel MD Physician Sign     EM1.3 10/20/2017  5:10 PM Xavier Coronel MD Physician      EM1.5 10/20/2017  4:35 PM Xavier Coronel MD Physician      EM1.4 10/20/2017  3:33 PM Xavier Coronel MD Physician      EM1.1 10/20/2017  3:30 PM Xavier Coronel MD Physician             ED Notes by Velia Box RN at 10/20/2017  3:31 PM     Author:  Velia Box RN Service:  General Medicine Author Type:  Registered Nurse    Filed:  10/20/2017  3:32 PM Date of Service:  10/20/2017  3:31 PM Creation Time:  10/20/2017  3:32 PM    Status:  Signed :  Velia Box RN (Registered Nurse)         Pt to xray via cot.[KD1.1]       Revision History        User Key Date/Time User Provider Type Action    > KD1.1 10/20/2017  3:32 PM Velia Box RN Registered Nurse Sign            ED  Notes by Velia Box RN at 10/20/2017  3:18 PM     Author:  Velia Box RN Service:  General Medicine Author Type:  Registered Nurse    Filed:  10/20/2017  3:20 PM Date of Service:  10/20/2017  3:18 PM Creation Time:  10/20/2017  3:20 PM    Status:  Signed :  Velia Box RN (Registered Nurse)         Pt was cutting firewood, using chainsaw, cut into toes on L foot.  Pt report Tdap up to date.  Pt denies numbness or tingling to foot.  Reports 6/10 pain to toes/foot.[KD1.1]       Revision History        User Key Date/Time User Provider Type Action    > KD1.1 10/20/2017  3:20 PM Velia Box RN Registered Nurse Sign                  Procedure Notes     No notes of this type exist for this encounter.      Progress Notes - Therapies (Notes from 10/18/17 through 10/21/17)     No notes of this type exist for this encounter.

## 2017-10-20 NOTE — ED PROVIDER NOTES
History     Chief Complaint   Patient presents with     Laceration     chain saw lac to lt middle and 4th toe     HPI  Ricci Lawrence is a 42 year old male who presents to the emergency department to be evaluated for laceration on the left foot.  Patient accidentally sustained a laceration on the left foot from a chainsaw.  He was trying to split some firewood and the chainsaw accidentally landed on his left foot dorsal surface sustaining a deep laceration on the third and fourth toes.  Bleeding was controlled with direct pressure.  His last tetanus shot was in 2015.    Problem List:    There are no active problems to display for this patient.       Past Medical History:    No past medical history on file.    Past Surgical History:    No past surgical history on file.    Family History:    No family history on file.    Social History:  Marital Status:   [2]  Social History   Substance Use Topics     Smoking status: Not on file     Smokeless tobacco: Not on file     Alcohol use Not on file        Medications:      SIMVASTATIN PO   Acetaminophen (TYLENOL PO)   IBUPROFEN PO         Review of Systems   All other systems reviewed and are negative.      Physical Exam   BP: 152/99  Heart Rate: 103  Temp: 98.8  F (37.1  C)  Resp: 18  SpO2: 94 %      Physical Exam   Constitutional: He is oriented to person, place, and time. He appears well-developed and well-nourished. No distress.   HENT:   Head: Atraumatic.   Mouth/Throat: Oropharynx is clear and moist. No oropharyngeal exudate.   Eyes: Pupils are equal, round, and reactive to light. No scleral icterus.   Cardiovascular: Normal rate, regular rhythm, normal heart sounds and intact distal pulses.    Pulmonary/Chest: Breath sounds normal. No respiratory distress. He has no wheezes. He has no rales.   Abdominal: Soft. Bowel sounds are normal. There is no tenderness. There is no rebound and no guarding.   Musculoskeletal: He exhibits no edema or tenderness.         Feet:    Neurological: He is alert and oriented to person, place, and time.   Skin: Skin is warm. No rash noted. He is not diaphoretic.   Nursing note and vitals reviewed.      ED Course   Patient evaluated and laboratory tests ordered.  X-ray of the left foot ordered.  4:35 PM: Dr. William (general surgeon on-call) consulted to come and review the laceration for possible surgical toilet in the OR.    ED Course     Procedures         Labs Ordered and Resulted from Time of ED Arrival Up to the Time of Departure from the ED   BASIC METABOLIC PANEL - Abnormal; Notable for the following:        Result Value    Glucose 106 (*)     All other components within normal limits   CBC WITH PLATELETS DIFFERENTIAL - Abnormal; Notable for the following:     WBC 11.8 (*)     Absolute Neutrophil 8.7 (*)     All other components within normal limits       Assessments & Plan (with Medical Decision Making)   Laceration the dorsal surface of left foot: X-rays are negative for any fractures. Patient discharged to the operating room under the care of Dr. William.  Discharge home on crutches.  All questions answered.  Follow-up with PCP on Monday.    I have reviewed the nursing notes.    I have reviewed the findings, diagnosis, plan and need for follow up with the patient.    New Prescriptions    No medications on file       Final diagnoses:   Foot laceration, left, initial encounter       10/20/2017   [facility]     Xavier Coronel MD  10/20/17 6014

## 2017-10-20 NOTE — CONSULTS
Surgery Consult Clinic Note      RE: Ricci Lawrence  : 1975  YULY: 10/20/2017      Chief Complaint:  Left 4th toe laceration    History of Present Illness:  Mr. Lawrence is a very pleasant 42 year old year old male who I am seeing at the request of Dr. Coronel of the emergency department for evaluation of laceration of the left 4th toe and to consider surgical debridement.  Was cutting wood with a chainsaw this afternoon when his foot slipped and was cut with the chain saw.  3 view xray did not demonstrate any bony fracture.  Dr. Coronel is requesting surgical debridement given the mechanism and the fact there's bone exposed in the wound.  Patient works as an  and is on his feet every day and reliant on being ambulatory.  Denies smoking.  No cardiovascular disease.  Has had anesthesia in the past with no issues.   He specifically denies fever, chills, nausea, vomiting, chest pain, shortness of breath or palpitations.      Medical history:  Hyperlipidemia    Surgical history:  No past surgical history on file.    Family history:  Non contributory     Medications:  Current Outpatient Prescriptions   Medication Sig Dispense Refill     SIMVASTATIN PO Take 80 mg by mouth daily       Acetaminophen (TYLENOL PO) Take by mouth every 6 hours as needed for mild pain or fever       IBUPROFEN PO Take by mouth every 6 hours as needed for moderate pain       Allergies:  The patienthas No Known Allergies.  .  Social history:  Social History   Substance Use Topics     Smoking status: Not on file     Smokeless tobacco: Not on file     Alcohol use Not on file     Marital status: .    Review of Systems:    Constitutional: Negative for fever, chills and weight loss.   HENT: Negative for ear pain, nosebleeds, congestion, sore throat, tinnitus and ear discharge.    Eyes: Negative for blurred vision, double vision, photophobia and pain.   Respiratory: Negative for cough, hemoptysis, shortness of breath, wheezing  and stridor.    Cardiovascular: Negative for chest pain, palpitations and orthopnea.   Gastrointestinal: Negative for heartburn, nausea, vomiting, abdominal pain and blood in stool.   Genitourinary: Negative for urgency, frequency and hematuria.   Musculoskeletal: Negative for myalgias, back pain and joint pain.   Neurological: Negative for tingling, speech change and headaches.   Endo/Heme/Allergies: Does not bruise/bleed easily.   Psychiatric/Behavioral: Negative for depression, suicidal ideas and hallucinations. The patient is not nervous/anxious.    Physical Examination:  /99  Temp 98.8  F (37.1  C) (Tympanic)  Resp 18  SpO2 94%  General: AAOx4, NAD, WN/WD, ambulating without assistance  HEENT:NCAT, EOMI, PERRL Sclerae anicteric; Trachea mideline, no JVD  Chest:   Clear to auscultation bilaterally.  Cardiac: S1S2 , regular rate and rhythm without additional sounds  Abdomen: Soft, ND/NT no rebound, no guarding  Extremities: Cursory exam unremarkable. Left foot, dorsum aspect, across the DIP a oblique laceration with significant separation of the skin edges with bone visible in the wound.  5/5 MS, able to wiggle toes, sensation intact  Skin: Warm, dry, < 2 sec cap refill  Neuro: CN 2-12 grossly intact, no focal deficit, GCS 15  Psych: happy, calm, asks appropriate questions    Results for orders placed or performed during the hospital encounter of 10/20/17   Foot XR, G/E 3 views, left    Narrative    PROCEDURE: XR FOOT LT G/E 3 VW 10/20/2017 3:35 PM    HISTORY: pain    COMPARISONS: None.    TECHNIQUE: 3 views.    FINDINGS: No fracture, dislocation or other focal bony abnormality is  seen.    There is a small spur at the insertion site of the Achilles tendon.         Impression    IMPRESSION: Calcaneal spur.    GENEVA JONES MD   Basic metabolic panel   Result Value Ref Range    Sodium 139 133 - 144 mmol/L    Potassium 3.8 3.4 - 5.3 mmol/L    Chloride 103 94 - 109 mmol/L    Carbon Dioxide 27 20 - 32  mmol/L    Anion Gap 9 3 - 14 mmol/L    Glucose 106 (H) 70 - 99 mg/dL    Urea Nitrogen 13 7 - 30 mg/dL    Creatinine 1.00 0.66 - 1.25 mg/dL    GFR Estimate 82 >60 mL/min/1.7m2    GFR Estimate If Black >90 >60 mL/min/1.7m2    Calcium 9.1 8.5 - 10.1 mg/dL   CBC with platelets differential   Result Value Ref Range    WBC 11.8 (H) 4.0 - 11.0 10e9/L    RBC Count 4.63 4.4 - 5.9 10e12/L    Hemoglobin 14.6 13.3 - 17.7 g/dL    Hematocrit 41.6 40.0 - 53.0 %    MCV 90 78 - 100 fl    MCH 31.5 26.5 - 33.0 pg    MCHC 35.1 31.5 - 36.5 g/dL    RDW 12.1 10.0 - 15.0 %    Platelet Count 273 150 - 450 10e9/L    Diff Method Automated Method     % Neutrophils 73.5 %    % Lymphocytes 15.9 %    % Monocytes 8.4 %    % Eosinophils 1.7 %    % Basophils 0.2 %    % Immature Granulocytes 0.3 %    Nucleated RBCs 0 0 /100    Absolute Neutrophil 8.7 (H) 1.6 - 8.3 10e9/L    Absolute Lymphocytes 1.9 0.8 - 5.3 10e9/L    Absolute Monocytes 1.0 0.0 - 1.3 10e9/L    Absolute Eosinophils 0.2 0.0 - 0.7 10e9/L    Absolute Basophils 0.0 0.0 - 0.2 10e9/L    Abs Immature Granulocytes 0.0 0 - 0.4 10e9/L    Absolute Nucleated RBC 0.0        Assessment/Plan:  #1 Chainsaw accident  #2 Left 4th and 3rd toe laceration    Patient stable, but is in a profession where is feet are valuable.  The bone is exposed and given the environment and mechanism I believe that we should debride this surgically and attempt at closure.  The patient did eat, but we can explore the wound under local anesthetic with IV sedation.  Patient is agreeable with this plan.          Dr Wliliam  [facility]  [address], [address]  [place]    [address]    Referring Provider:  No referring provider defined for this encounter.     Primary Care Provider:  Efrain Larson

## 2017-10-20 NOTE — BRIEF OP NOTE
[facility] - Brief Operative Note    Pre-operative diagnosis: Left 4th and 3rd toe laceration   Post-operative diagnosis Compound fracture left 4th toe, laceration left 3rd toe   Procedure: Irrigation and debridement left toes   Surgeon: Rene William DO   Anesthesia: Monitor Anesthesia Care    Estimated blood loss: 1 mL   Blood transfusion: No transfusion was given during surgery   Drains: 0   Specimens: None   Findings: Partial compound fracture through left 4th mid phalanx, laceration 3rd left toe   Complications: None   Condition: Stable   Comments: Details included in dictated operative note.

## 2017-10-20 NOTE — ANESTHESIA PREPROCEDURE EVALUATION
Anesthesia Evaluation     .             ROS/MED HX    ENT/Pulmonary:  - neg pulmonary ROS   (+)tobacco use, Past use , . .    Neurologic:  - neg neurologic ROS     Cardiovascular:     (+) Dyslipidemia, ----. : . . . :. .       METS/Exercise Tolerance:     Hematologic:  - neg hematologic  ROS       Musculoskeletal:   (+) arthritis, , , -       GI/Hepatic:  - neg GI/hepatic ROS       Renal/Genitourinary:  - ROS Renal section negative       Endo:  - neg endo ROS       Psychiatric:  - neg psychiatric ROS       Infectious Disease:  - neg infectious disease ROS       Malignancy:      - no malignancy   Other:    - neg other ROS                 Physical Exam  Normal systems: cardiovascular and pulmonary    Airway   Mallampati: II  TM distance: >3 FB  Neck ROM: full    Dental     Cardiovascular   Rhythm and rate: regular and normal      Pulmonary    breath sounds clear to auscultation                    Anesthesia Plan      History & Physical Review  History and physical reviewed and following examination; no interval change.    ASA Status:  2 emergent.    NPO Status:  > 6 hours    Plan for MAC with Propofol induction. Reason for MAC:  Deep or markedly invasive procedure (G8)  PONV prophylaxis:  Ondansetron (or other 5HT-3) and Dexamethasone or Solumedrol       Postoperative Care  Postoperative pain management:  IV analgesics and Oral pain medications.      Consents  Anesthetic plan, risks, benefits and alternatives discussed with:  Patient..                          .

## 2017-10-20 NOTE — IP AVS SNAPSHOT
[facility]    [address] [place] [id number]    Phone:  [phone]    Fax:  [phone]                                       After Visit Summary   10/20/2017    Ricci Lawrence    MRN: [id number]           After Visit Summary Signature Page     I have received my discharge instructions, and my questions have been answered. I have discussed any challenges I see with this plan with the nurse or doctor.    ..........................................................................................................................................  Patient/Patient Representative Signature      ..........................................................................................................................................  Patient Representative Print Name and Relationship to Patient    ..................................................               ................................................  Date                                            Time    ..........................................................................................................................................  Reviewed by Signature/Title    ...................................................              ..............................................  Date                                                            Time

## 2017-10-20 NOTE — ED NOTES
Pt was cutting firewood, using chainsaw, cut into toes on L foot.  Pt report Tdap up to date.  Pt denies numbness or tingling to foot.  Reports 6/10 pain to toes/foot.

## 2017-10-20 NOTE — IP AVS SNAPSHOT
MRN:[id number]                      After Visit Summary   10/20/2017    Ricci Lawrence    MRN: [id number]           Thank you!     Thank you for choosing [facility] for your care. Our goal is always to provide you with excellent care. Hearing back from our patients is one way we can continue to improve our services. Please take a few minutes to complete the written survey that you may receive in the mail after you visit with us. Thank you!        Patient Information     Date Of Birth          1975        Designated Caregiver       Most Recent Value    Caregiver    Will someone help with your care after discharge? yes    Name of designated caregiver Yokasta    Phone number of caregiver [phone]    Caregiver address [place]      About your hospital stay     You were admitted on:  October 20, 2017 You last received care in the:  [facility]    You were discharged on:  October 21, 2017        Reason for your hospital stay       Trauma observation                  Who to Call     For medical emergencies, please call 911.  For non-urgent questions about your medical care, please call your primary care provider or clinic, [phone]  For questions related to your surgery, please call your surgery clinic        Attending Provider     Provider Specialty    Xavier Coronel MD Emergency Medicine    Rene William, DO Surgery       Primary Care Provider Office Phone # Fax #    Efrain Larson [phone]      After Care Instructions     Activity       Your activity upon discharge: activity as tolerated, no driving while on analgesics and partial weight bearing left leg            Diet       Follow this diet upon discharge: Regular            Supplies       4x4 guaze and xeroform strips            Wound care and dressings       Instructions to care for your wound at home: daily dressing changes and keep wound clean and dry.                  Follow-up Appointments      "Follow-up and recommended labs and tests        Follow up appointment with Dr. William in 1 week for wound check.  Please don't hesitate to call my office with any questions or concerns.  Things to watch for are fevers greater than 101.3, pain uncontrolled by pain narcotics, deep red skin around the incision and puss coming out of the incision.                  Future tests that were ordered for you     Alum Crutches: Adult       Use gait belt during crutch training.                  Further instructions from your care team       Unable to make follow up appointment on weekend, please call [phone] on Monday to schedule.    Follow up appointment with Dr. William in 1 week for wound check.  Please don't hesitate to call my office with any questions or concerns.  Things to watch for are fevers greater than 101.3, pain uncontrolled by pain narcotics, deep red skin around the incision and puss coming out of the incision.     Pending Results     No orders found for last 3 day(s).            Statement of Approval     Ordered          10/21/17 0806  I have reviewed and agree with all the recommendations and orders detailed in this document.  EFFECTIVE NOW     Approved and electronically signed by:  Rene William DO             Admission Information     Date & Time Provider Department Dept. Phone    10/20/2017 Rene William,  [facility] [phone]      Your Vitals Were     Blood Pressure Pulse Temperature Respirations Height Weight    117/78 (BP Location: Left arm) 78 98.5  F (36.9  C) (Tympanic) 18 1.829 m (6') 110.5 kg (243 lb 8 oz)    Pulse Oximetry BMI (Body Mass Index)                95% 33.02 kg/m2          MyCharCareParent Information     [email or web address] lets you send messages to your doctor, view your test results, renew your prescriptions, schedule appointments and more. To sign up, go to [email or web address] . Click on \"Log in\" on the left side of the screen, which will take you to the Welcome page. " "Then click on \"Sign up Now\" on the right side of the page.     You will be asked to enter the access code listed below, as well as some personal information. Please follow the directions to create your username and password.     Your access code is: [email or web address]  Expires: 2018  5:36 PM     Your access code will  in 90 days. If you need help or a new code, please call your [facility] clinic or [phone].        Care EveryWhere ID     This is your Care EveryWhere ID. This could be used by other organizations to access your [facility] medical records  [id number]        Equal Access to Services     [facility] : Mac Mora, amanda calhoun, obi escalera, kendra stiles . [facility] [phone].    ATENCIÓN: Si habla español, tiene a winston disposición servicios gratuitos de asistencia lingüística. [facility] [phone].    We comply with applicable federal civil rights laws and [place] laws. We do not discriminate on the basis of race, color, national origin, age, disability, sex, sexual orientation, or gender identity.               Review of your medicines      START taking        Dose / Directions    acetaminophen-codeine 300-30 MG per tablet   Commonly known as:  TYLENOL #3        Dose:  1 tablet   Take 1 tablet by mouth every 6 hours as needed for moderate pain   Quantity:  20 tablet   Refills:  0       cefuroxime 500 MG tablet   Commonly known as:  CEFTIN        Dose:  500 mg   Take 1 tablet (500 mg) by mouth 2 times daily   Quantity:  20 tablet   Refills:  0         CONTINUE these medicines which have NOT CHANGED        Dose / Directions    IBUPROFEN PO        Take by mouth every 6 hours as needed for moderate pain   Refills:  0       SIMVASTATIN PO        Dose:  80 mg   Take 80 mg by mouth daily   Refills:  0       TYLENOL PO        Take by mouth every 6 hours as needed for mild pain or fever   Refills:  0            Where to get your medicines    "   These medications were sent to [id number] - [place], [place] - [address] AT [facility] [address]  [address], [place] [address]     Phone:  [phone]     cefuroxime 500 MG tablet         Some of these will need a paper prescription and others can be bought over the counter. Ask your nurse if you have questions.     Bring a paper prescription for each of these medications     acetaminophen-codeine 300-30 MG per tablet               ANTIBIOTIC INSTRUCTION     You've Been Prescribed an Antibiotic - Now What?  Your healthcare team thinks that you or your loved one might have an infection. Some infections can be treated with antibiotics, which are powerful, life-saving drugs. Like all medications, antibiotics have side effects and should only be used when necessary. There are some important things you should know about your antibiotic treatment.      Your healthcare team may run tests before you start taking an antibiotic.    Your team may take samples (e.g., from your blood, urine or other areas) to run tests to look for bacteria. These test can be important to determine if you need an antibiotic at all and, if you do, which antibiotic will work best.      Within a few days, your healthcare team might change or even stop your antibiotic.    Your team may start you on an antibiotic while they are working to find out what is making you sick.    Your team might change your antibiotic because test results show that a different antibiotic would be better to treat your infection.    In some cases, once your team has more information, they learn that you do not need an antibiotic at all. They may find out that you don't have an infection, or that the antibiotic you're taking won't work against your infection. For example, an infection caused by a virus can't be treated with antibiotics. Staying on an antibiotic when you don't need it is more likely to be harmful than helpful.       You may experience side effects from your antibiotic.    Like all medications, antibiotics have side effects. Some of these can be serious.    Let you healthcare team know if you have any known allergies when you are admitted to the hospital.    One significant side effect of nearly all antibiotics is the risk of severe and sometimes deadly diarrhea caused by Clostridium difficile (C. Difficile). This occurs when a person takes antibiotics because some good germs are destroyed. Antibiotic use allows C. diificile to take over, putting patients at high risk for this serious infection.    As a patient or caregiver, it is important to understand your or your loved one's antibiotic treatment. It is especially important for caregivers to speak up when patients can't speak for themselves. Here are some important questions to ask your healthcare team.    What infection is this antibiotic treating and how do you know I have that infection?    What side effects might occur from this antibiotic?    How long will I need to take this antibiotic?    Is it safe to take this antibiotic with other medications or supplements (e.g., vitamins) that I am taking?     Are there any special directions I need to know about taking this antibiotic? For example, should I take it with food?    How will I be monitored to know whether my infection is responding to the antibiotic?    What tests may help to make sure the right antibiotic is prescribed for me?      Information provided by:  www.cdc.gov/getsmart  U.S. Department of Health and Human Services  Centers for disease Control and Prevention  National Center for Emerging and Zoonotic Infectious Diseases  Division of Healthcare Quality Promotion         Protect others around you: Learn how to safely use, store and throw away your medicines at www.disposemymeds.org.             Medication List: This is a list of all your medications and when to take them. Check marks below indicate your daily  home schedule. Keep this list as a reference.      Medications           Morning Afternoon Evening Bedtime As Needed    acetaminophen-codeine 300-30 MG per tablet   Commonly known as:  TYLENOL #3   Take 1 tablet by mouth every 6 hours as needed for moderate pain                                cefuroxime 500 MG tablet   Commonly known as:  CEFTIN   Take 1 tablet (500 mg) by mouth 2 times daily                                IBUPROFEN PO   Take by mouth every 6 hours as needed for moderate pain                                SIMVASTATIN PO   Take 80 mg by mouth daily                                TYLENOL PO   Take by mouth every 6 hours as needed for mild pain or fever   Last time this was given:  650 mg on 10/21/2017  8:34 AM                                          More Information        Foot Laceration: All Closures  A laceration is a cut through the skin. Deep cuts may require stitches. Minor cuts may be treated with surgical tape closures or skin glue.  X-rays may be done if something may have entered the skin through the cut, such as glass or rocks. You may also need a tetanus shot if you are not up to date on this vaccination and the object that caused the cut may lead to tetanus.    Home care    Your healthcare provider may prescribe an antibiotic. This is to help prevent infection. Follow all instructions for taking this medicine. Take the medicine every day until it is gone or you are told to stop. You should not have any left over.    The healthcare provider may prescribe medicines for pain. Follow instructions for taking them.    Follow the healthcare provider s instructions on how to care for the cut.    You may be given instructions for keeping weight off of the area to allow the injury to heal.     Follow the healthcare provider s instructions on how to care for the cut.     Keep the wound clean and dry. Don't get the wound wet until you are told it is OK to do so. If the area gets wet, gently pat it  dry with a clean cloth. Replace the wet bandage with a dry one.    To help prevent infection, wash your hands with soap and water before and after caring for the wound.     Caring for stitches: Once you no longer need to keep the stitches dry, clean the wound daily. First, remove the bandage. Then wash the area gently with soap and warm water, or as directed by the healthcare provider. Use a wet cotton swab to loosen and remove any blood or crust that forms. After cleaning, apply a thin layer of antibiotic ointment if advised. Then put on a new bandage unless you are told not to.    Caring for skin glue: Don t put apply liquid, ointment, or cream on the wound while the glue is in place. Avoid activities that cause heavy sweating. Protect the wound from sunlight. Don't scratch, rub, or pick at the adhesive film. Don't place tape directly over the film. The glue should peel off within 5 to 10 days.     Caring for surgical tape: Keep the area dry. If it gets wet, blot it dry with a clean towel. Surgical tape usually falls off within 7 to 10 days. If it has not fallen off after 10 days, you can take it off yourself. Put mineral oil or petroleum jelly on a cotton ball and gently rub the tape until it is removed.    Once you can get the wound wet, you may shower as usual but don't soak the wound in water (no tub baths or swimming)    Even with proper treatment, a wound infection may sometimes occur. Check the wound daily for signs of infection listed below.  Follow-up care  Follow up with your healthcare provider, or as advised. Return to have stitches removed as directed.  When to seek medical advice  Call your healthcare provider right away if any of these occur:    Wound bleeding not controlled by direct pressure    Signs of infection, including increasing pain in the wound, increasing wound redness or swelling, or pus or bad odor coming from the wound    Fever of 100.4 F (38. C) or as directed by your healthcare  provider    Stitches come apart or fall out or surgical tape falls off before 7 days    Wound edges re-open    Wound changes colors    Numbness or weakness in the affected foot     Decreased movement of the foot  Date Last Reviewed: 7/1/2017 2000-2017 [employer]. [address] [place] [address]. All rights reserved. This information is not intended as a substitute for professional medical care. Always follow your healthcare professional's instructions.                Patient Education    Acetaminophen, Codeine Phosphate Elixir    Acetaminophen, Codeine Phosphate Oral solution    Acetaminophen, Codeine Phosphate Oral suspension    Acetaminophen, Codeine Phosphate Oral tablet  Acetaminophen, Codeine Phosphate Oral tablet  What is this medicine?  ACETAMINOPHEN; CODEINE (a set a DIAZ john fen; KOE maksim) is a pain reliever. It is used to treat mild to moderate pain.  This medicine may be used for other purposes; ask your health care provider or pharmacist if you have questions.  What should I tell my health care provider before I take this medicine?  They need to know if you have any of these conditions:    brain tumor    Crohn's disease, inflammatory bowel disease, or ulcerative colitis    drug abuse or addiction    head injury    heart or circulation problems    if you often drink alcohol    kidney disease or problems going to the bathroom    liver disease    lung disease, asthma, or breathing problems    an unusual or allergic reaction to acetaminophen, codeine, salicylates, other opioid analgesics, other medicines, foods, dyes, or preservatives    pregnant or trying to get pregnant    breast-feeding  How should I use this medicine?  Take this medicine by mouth with a full glass of water. Follow the directions on the prescription label. If the medicine upsets your stomach, take the medicine with food or milk. Do not take more medicine than you are told to take.   Talk to your pediatrician  regarding the use of this medicine in children. Special care may be needed.  Overdosage: If you think you have taken too much of this medicine contact a poison control center or emergency room at once.  NOTE: This medicine is only for you. Do not share this medicine with others.  What if I miss a dose?  If you miss a dose, take it as soon as you can. If it is almost time for your next dose, take only that dose. Do not take double or extra doses.  What may interact with this medicine?    alcohol    antihistamines    benztropine    drugs for bladder problems like solifenacin, trospium, oxybutynin, tolterodine, hycosamine, and methscopolamine    drugs for breathing problems like ipratropium and tiotropium    drugs for certain stomach or intestine problems like propantheline, homatropine methylbromide, glycopyrrolate, atropine, belladonna, and dicyclomine    medicines for depression, anxiety, or psychotic disturbances    medicines for sleep    muscle relaxants    naltrexone    narcotic medicines (opiates) for pain    phenothiazines like perphenazine, thioridazine, chlorpromazine, mesoridazine, fluphenazine, prochlorperazine, promazine, trifluoperazine    scopolamine    tramadol    trihexyphenidyl  This list may not describe all possible interactions. Give your health care provider a list of all the medicines, herbs, non-prescription drugs, or dietary supplements you use. Also tell them if you smoke, drink alcohol, or use illegal drugs. Some items may interact with your medicine.  What should I watch for while using this medicine?  Tell your doctor or health care professional if your pain does not go away, if it gets worse, or if you have new or a different type of pain. You may develop tolerance to the medication. Tolerance means that you will need a higher dose of the medication for pain relief. Tolerance is normal and is expected if you take the medicine for a long time.  Do not suddenly stop taking your medicine  because you may develop a severe reaction. Your body becomes used to the medicine. This does NOT mean you are addicted. Addiction is a behavior related to getting and using a drug for a non medical reason. If you have pain, you have a medical reason to take pain medicine. Your doctor will tell you how much medicine to take. If your doctor wants you to stop the medicine, the dose will be slowly lowered over time to avoid any side effects.  You may get drowsy or dizzy. Do not drive, use machinery, or do anything that needs mental alertness until you know how this medicine affects you. Do not stand or sit up quickly, especially if you are an older patient. This reduces the risk of dizzy or fainting spells. Alcohol may interfere with the effect of this medicine. Avoid alcoholic drinks.  There are different types of narcotic medicines (opiates) for pain. If you take more than one type at the same time, you may have more side effects. Give your health care provider a list of all medicines you use. Your doctor will tell you how much medicine to take. Do not take more medicine than directed. Call emergency for help if you have problems breathing.  The medicine will cause constipation. Try to have a bowel movement at least every 2 to 3 days. If you do not have a bowel movement for 3 days, call your doctor or health care professional.  Do not take Tylenol (acetaminophen) or medicines that have acetaminophen with this medicine. Too much acetaminophen can be very dangerous. Many nonprescription medicines contain acetaminophen. Always read the labels carefully to avoid taking more acetaminophen.  Immediately call your physician or get emergency help if you are breast-feeding and your baby is sleepier than usual, is limp, or has difficulty breastfeeding or breathing.  What side effects may I notice from receiving this medicine?  Side effects that you should report to your doctor or health care professional as soon as  possible:    allergic reactions like skin rash, itching or hives, swelling of the face, lips, or tongue    breathing difficulties, wheezing    confusion    light headedness or fainting spells    severe stomach pain    yellowing of the skin or the whites of the eyes  Side effects that usually do not require medical attention (report to your doctor or health care professional if they continue or are bothersome):    dizziness    drowsiness    nausea, vomiting  This list may not describe all possible side effects. Call your doctor for medical advice about side effects. You may report side effects to FDA at 1-102-FDA-2442.  Where should I keep my medicine?  Keep out of the reach of children. This medicine can be abused. Keep your medicine in a safe place to protect it from theft. Do not share this medicine with anyone. Selling or giving away this medicine is dangerous and against the law.  Store at room temperature between 15 and 30 degrees C (59 and 86 degrees F). Protect from light. Keep container tightly closed.  Throw away any unused medicine after the expiration date. Discard unused medicine and used packaging carefully. Pets and children can be harmed if they find used or lost packages.  NOTE: This sheet is a summary. It may not cover all possible information. If you have questions about this medicine, talk to your doctor, pharmacist, or health care provider.  NOTE:This sheet is a summary. It may not cover all possible information. If you have questions about this medicine, talk to your doctor, pharmacist, or health care provider. Copyright  2016 [employer]                Cefuroxime tablets  Brand Names: Alti-Cefuroxime, Ceftin  What is this medicine?  CEFUROXIME (se fyoor OX eem) is a cephalosporin antibiotic. It is used to treat certain kinds of bacterial infections. It will not work for colds, flu, or other viral infections.  How should I use this medicine?  Take this medicine by mouth with a full glass of  water. Follow the directions on the prescription label. Do not crush or chew. This medicine works best if you take it with food. Take your medicine at regular intervals. Do not take your medicine more often than directed. Take all of your medicine as directed even if you think your are better. Do not skip doses or stop your medicine early.  Talk to your pediatrician regarding the use of this medicine in children. Special care may be needed. While this drug may be prescribed for children as young as 3 months of age for selected conditions, precautions do apply.  What side effects may I notice from receiving this medicine?  Side effects that you should report to your doctor or health care professional as soon as possible:    allergic reactions like skin rash, itching or hives, swelling of the face, lips, or tongue    dark urine    difficulty breathing    fever    irregular heartbeat or chest pain    redness, blistering, peeling or loosening of the skin, including inside the mouth    seizures    unusual bleeding or bruising    unusually weak or tired    white patches or sores in the mouth  Side effects that usually do not require medical attention (report to your doctor or health care professional if they continue or are bothersome):    diarrhea    gas or heartburn    headache    nausea, vomiting    vaginal itching  What may interact with this medicine?  This medicine may interact with the following medications:    antacids    birth control pills    certain medicines for infection like amikacin, gentamicin, tobramycin    diuretics    probenecid    warfarin  What if I miss a dose?  If you miss a dose, take it as soon as you can. If it is almost time for your next dose, take only that dose. Do not take double or extra doses.  Where should I keep my medicine?  Keep out of the reach of children.  Store at room temperature between 15 and 30 degrees C (59 and 86 degrees F). Keep container tightly closed. Protect from  moisture. Throw away any unused medicine after the expiration date.  What should I tell my health care provider before I take this medicine?  They need to know if you have any of these conditions:    bleeding problems    bowel disease, like colitis    kidney disease    liver disease    an unusual or allergic reaction to cefuroxime, other antibiotics or medicines, foods, dyes or preservatives    pregnant or trying to get pregnant    breast-feeding  What should I watch for while using this medicine?  Tell your doctor or health care professional if your symptoms do not improve or if you get new symptoms.  Do not treat diarrhea with over the counter products. Contact your doctor if you have diarrhea that lasts more than 2 days or if it is severe and watery.  This medicine can interfere with some urine glucose tests. If you use such tests, talk with your health care professional.  If you are being treated for a sexually transmitted disease, avoid sexual contact until you have finished your treatment. Your sexual partner may also need treatment.  NOTE:This sheet is a summary. It may not cover all possible information. If you have questions about this medicine, talk to your doctor, pharmacist, or health care provider. Copyright  2017 [employer]

## 2017-10-21 VITALS
OXYGEN SATURATION: 96 % | HEART RATE: 78 BPM | TEMPERATURE: 98 F | HEIGHT: 72 IN | RESPIRATION RATE: 16 BRPM | SYSTOLIC BLOOD PRESSURE: 120 MMHG | BODY MASS INDEX: 32.98 KG/M2 | WEIGHT: 243.5 LBS | DIASTOLIC BLOOD PRESSURE: 80 MMHG

## 2017-10-21 PROCEDURE — 25000132 ZZH RX MED GY IP 250 OP 250 PS 637: Performed by: SURGERY

## 2017-10-21 PROCEDURE — 25000128 H RX IP 250 OP 636: Performed by: SURGERY

## 2017-10-21 RX ORDER — CEFUROXIME AXETIL 500 MG/1
500 TABLET ORAL 2 TIMES DAILY
Qty: 20 TABLET | Refills: 0 | Status: SHIPPED | OUTPATIENT
Start: 2017-10-21 | End: 2017-10-31

## 2017-10-21 RX ADMIN — ACETAMINOPHEN 650 MG: 325 TABLET, FILM COATED ORAL at 08:34

## 2017-10-21 RX ADMIN — TAZOBACTAM SODIUM AND PIPERACILLIN SODIUM 3.38 G: 375; 3 INJECTION, SOLUTION INTRAVENOUS at 03:52

## 2017-10-21 ASSESSMENT — PAIN DESCRIPTION - DESCRIPTORS
DESCRIPTORS: DISCOMFORT
DESCRIPTORS: DISCOMFORT

## 2017-10-21 NOTE — DISCHARGE INSTRUCTIONS
Unable to make follow up appointment on weekend, please call [phone] on Monday to schedule.    Follow up appointment with Dr. William in 1 week for wound check.  Please don't hesitate to call my office with any questions or concerns.  Things to watch for are fevers greater than 101.3, pain uncontrolled by pain narcotics, deep red skin around the incision and puss coming out of the incision.

## 2017-10-21 NOTE — PROGRESS NOTES
S: POD #1 Irrigation and debridement of Left 3rd and 4th toe laceration with chainsaw.  No overnight events, no acute issues, hemodynamically stable, afebrile, denies pain.  Spoke with orthopedics oncall at [facility] who recommended antibiotics and no further surgical management needed.    O:  Temp: 98.5  F (36.9  C) Temp  Min: 96.9  F (36.1  C)  Max: 98.8  F (37.1  C)  Resp: 18 Resp  Min: 16  Max: 18  SpO2: 95 % SpO2  Min: 94 %  Max: 97 %  Heart Rate: 71 Heart Rate  Min: 63  Max: 103  BP: 117/78 Systolic (24hrs), Av , Min:108 , Max:152   Diastolic (24hrs), Av, Min:71, Max:100  Gen: sleeping, easily awaken, NAD  Wound: surgical dressing in place with no seepage    A/P  #1 Chainsaw laceration left 3rd and 4th toe    Received appropriate antibiotics.  Pain controlled.  Okay to discharge.

## 2017-10-21 NOTE — PLAN OF CARE
Problem: Patient Care Overview  Goal: Plan of Care/Patient Progress Review  Outcome: No Change  Pt to floor from ED this shift for lacerations/wounds to L 3rd and 4th toes. Dressing remains CDI. LLE elevated t/o shift. Vitals stable. Pain controlled with PRN Tylenol per patient report, offered pain medications occasionally t/o shift but declines. CMS intact to LLE. Up with assist of 1, crutches. IV Zosyn continues. Uses call light appropriately.      Face to face report given with opportunity to observe patient.     Report given to KOJO Ivey   10/21/2017  7:37 AM

## 2017-10-21 NOTE — OR NURSING
Pateint discharged to Acute Care Rm 3210.  Milton score 20. Pain level 0/10.  Discharged from unit via cart.  Hand off report given to KOJO Ragland.

## 2017-10-21 NOTE — PLAN OF CARE
"[place] Observation Note:  Patient is registered to observation and is in 3210/3210-1 at approximately 2000 via cart accompanied by spouse from surgery . Report received from KOJO Morgan in SBAR format at 2000 via face to face in room. Patient transferred to bed via self.. Patient is alert and oriented X 3, denies pain; rates at 0 on 0-10 scale.  Patient oriented to room, unit, hourly rounding, and plan of care. Explained the admission packet including \"What is Observation Status\" and patient handbook with patient bill of rights brochure. Will continue to monitor and document as needed.  Nursing Observation criteria listed below was met:    Health care directives status obtained and documented: Yes    Care Everywhere authorization completed No      MRSA swab completed for patient 65 years and older: N/A      If initial lactic acid >2.0, repeat lactic acid drawn within one hour of arrival to unit: NA. If no, state reason:       Patient identifies a surrogate decision maker: Yes If yes, who: Wife Sarah Contact Information:see facesheet    Vaccination assessment and education completed: Yes   Vaccinations received prior to hospitalization: Pneumovax no  Influenza(seasonal)  NO   Vaccination(s) ordered: patient declines      Skin issues/needs documented:NA    Isolation Patient: no Education given and documented, correct sign in place and documentation row added to PCS:  No      Fall Prevention: Observation fall risk completed:  Yes Education given and documented, sticker and magnet in place: Yes      General Care Plan initiated with observation goal(s): Yes    Education (including assessment) Documented: Yes    New medication information given to patient and documented: Yes    Patient elects to use own medications from home during hospitalization:  No If yes, a MD order was obtained to use Medications from Home:  No and home medications were sent to Pharmacy for verification for use during hospitalization: " No    Patient has discharge needs (If yes, please explain): No

## 2017-10-21 NOTE — ANESTHESIA POSTPROCEDURE EVALUATION
Patient: Ricci Lawrence    Procedure(s):  IRRIGATION AND DEBRIDEMENT LEFT 4TH TOE - Wound Class: II-Clean Contaminated    Diagnosis:chainsaw accident  Diagnosis Additional Information: No value filed.    Anesthesia Type:  MAC    Note:  Anesthesia Post Evaluation    Patient location during evaluation: Bedside  Patient participation: Able to fully participate in evaluation  Level of consciousness: awake and alert  Pain management: adequate  Airway patency: patent  Cardiovascular status: acceptable  Respiratory status: acceptable  Hydration status: acceptable  PONV: none             Last vitals:  Vitals:    10/20/17 2000 10/21/17 0109 10/21/17 0825   BP: 108/71 117/78 120/80   Pulse:      Resp: 16 18 16   Temp: 97.9  F (36.6  C) 98.5  F (36.9  C) 98  F (36.7  C)   SpO2: 97% 95% 96%         Electronically Signed By: NADJA Dobson CRNA  October 21, 2017  6:53 PM

## 2017-10-21 NOTE — PLAN OF CARE
Problem: Patient Care Overview  Goal: Plan of Care/Patient Progress Review  Outcome: Adequate for Discharge Date Met:  10/21/17  VSS.  Afebrile.  Wife with patient.  Patient states he is ready to go home.  Does not want to bring crutches home, is not using.  IV dc'd.  Patient discharged at 0925AM via ambulation accompanied by spouse and staff. Prescriptions sent to patients preferred pharmacy, and narcotic prescription given to patient to fill. All belongings sent with patient.      Discharge instructions reviewed with patient and spouse. Listed belongings gathered and returned to patient. clothing     Patient discharged to home.   Report called to      Core Measures and Vaccines  Core Measures applicable during stay: No. If yes, state diagnosis:   Pneumonia and Influenza given prior to discharge, if indicated: N/A     Surgical Patient   Surgical Procedures during stay: debridement of wound  Did patient receive discharge instruction on wound care and recognition of infection symptoms? Yes     MISC  Follow up appointment made:  No, unabale, patient will make on MONDAY  Home and hospital aquired medications returned to patient: N/A  Patient reports pain was well managed at discharge: Yes

## 2017-10-21 NOTE — OP NOTE
DATE OF SERVICE:  10/20/2017.        PREOPERATIVE DIAGNOSIS:  Left fourth and third toe laceration.      POSTOPERATIVE DIAGNOSIS:  Compound fracture of the left fourth toe, laceration left third toe.      PROCEDURE:  Irrigation and debridement of left toes.      INDICATION:  Ricci Lawrence is a 42-year-old gentleman sawing wood with a chain saw this afternoon suffered an accident with the chain saw to the dorsal aspect of his left fourth and third toe.  X-rays did not show any fractures; however, as the bone was in the wound, I decided that a formal irrigation and debridement was warranted here for definitive care.      SURGEON:  Rene William DO      WOUND TYPE:  4, dirty.      DRAINS:  Zero.      DESCRIPTION OF PROCEDURE:  The patient was brought into the operating room and placed supine on the operating table.  After monitored attended anesthesia was administered, the left foot was prepped and draped in the usual sterile fashion.  After preprocedural pause identifying the patient and procedure, position and antibiotics, local anesthetic was infiltrated at the base of the 4th and 3rd left toes for digital block.  Once the anesthetic had taken effect, a Pulsavac then was used to irrigate both wounds.  Using a curet then all devitalized tissue was removed; less than 20 mL of tissue was removed.  On the fourth left toe laceration, this went down to the bone and exploring this digitally I could feel that the bone edges were rough and I could see superficial injury to the dorsal aspect of the mid and distal phalanx crossing through the joint space.  The tendon overlying the joint then was reapproximated with an interrupted 5-0 Vicryl and the wound was partially closed with interrupted 5-0 Prolenes.  Then my attention turned towards the third toe.  Hemostasis was achieved with electrocautery.  The wound did not probe down to the bone.  There was not any exposed ligamentous or tendon injury.  The wound then was closed  with interrupted 5-0 Prolenes.  Bandages then were applied.  The toes were rosas taped together.  The patient tolerated the procedure well and was taken to postanesthesia care unit.         CATHERINE HAWLEY DO             D: 10/20/2017 18:58   T: 10/20/2017 20:07   MT:       Name:     ZIGGY DUDLEY   MRN:      [id number]        Account:        [id number]   :      1975           Procedure Date: 10/20/2017      Document: [id number]

## 2017-10-21 NOTE — DISCHARGE SUMMARY
Physician Discharge Summary     Patient ID:  Ricci Lawrence  [id number]  42 year old  1975    Admit date: 10/20/2017    Discharge date and time: 10/21/2017     Admitting Physician: Rene William DO     Discharge Physician: Rene William DO    Admission Diagnoses: Foot laceration, left, initial encounter [S91.312A]  Compound fracture [T14.8XXA]    Discharge Diagnoses: Same    Admission Condition: fair    Discharged Condition: good    Indication for Admission: IV antibiotics    Hospital Course: uncomplicated    Consults: Orthopedic surgery    Significant Diagnostic Studies: radiology: X-Ray: Left foot:   PROCEDURE:  XR FOOT LT G/E 3 VW     HISTORY: compound fracture     COMPARISON:  10/20/2017     TECHNIQUE:  3 views of the left foot were obtained.     FINDINGS:  No fracture or dislocation is identified. The joint spaces  are preserved.           IMPRESSION: No acute fracture.       HOLLAND CASTILLO MD         Treatments: antibiotics: Zosyn and surgery: Irrigation and debridement    Discharge Exam:  Temp: 98.5  F (36.9  C) Temp  Min: 96.9  F (36.1  C)  Max: 98.8  F (37.1  C)  Resp: 18 Resp  Min: 16  Max: 18  SpO2: 95 % SpO2  Min: 94 %  Max: 97 %  Heart Rate: 71 Heart Rate  Min: 63  Max: 103  BP: 117/78 Systolic (24hrs), Av , Min:108 , Max:152   Diastolic (24hrs), Av, Min:71, Max:100  Gen: sleeping, easily awaken, NAD  Wound: surgical dressing in place with no seepage       Disposition: home    Patient Instructions:   Current Discharge Medication List      CONTINUE these medications which have NOT CHANGED    Details   SIMVASTATIN PO Take 80 mg by mouth daily      Acetaminophen (TYLENOL PO) Take by mouth every 6 hours as needed for mild pain or fever      IBUPROFEN PO Take by mouth every 6 hours as needed for moderate pain           Activity: activity as tolerated, no driving while on analgesics and partial weight bearing left leg  Diet: regular diet  Wound Care: keep wound clean and dry and  reinforce dressing PRN    Follow-up with Dr. William in 1 week.    Signed:  Rene William  10/21/2017  8:01 AM

## 2017-10-21 NOTE — ANESTHESIA CARE TRANSFER NOTE
Patient: Ricci Lawrence    Procedure(s):  IRRIGATION AND DEBRIDEMENT LEFT 4TH TOE - Wound Class: II-Clean Contaminated    Diagnosis: chainsaw accident  Diagnosis Additional Information: No value filed.    Anesthesia Type:   MAC     Note:  Airway :Nasal Cannula  Patient transferred to:ICU  ICU Handoff: Call for PAUSE to initiate/utilize ICU HANDOFF, Identified Patient, Identified Responsible Provider, Reviewed the Pertinent Medical History, Discussed Surgical Course, Reviewed Intra-OP Anesthesia Management and Issues during Anesthesia, Set Expectations for Post Procedure Period and Allowed Opportunity for Questions and Acknowledgement of Understanding      Vitals: (Last set prior to Anesthesia Care Transfer)    CRNA VITALS  10/20/2017 1825 - 10/20/2017 1905      10/20/2017             Resp Rate (set): 8                Electronically Signed By: NADJA Dobson CRNA  October 20, 2017  7:05 PM

## 2017-10-31 ENCOUNTER — OFFICE VISIT (OUTPATIENT)
Dept: SURGERY | Facility: OTHER | Age: 42
End: 2017-10-31
Attending: SURGERY
Payer: COMMERCIAL

## 2017-10-31 VITALS
WEIGHT: 250 LBS | OXYGEN SATURATION: 94 % | HEIGHT: 74 IN | DIASTOLIC BLOOD PRESSURE: 74 MMHG | SYSTOLIC BLOOD PRESSURE: 136 MMHG | BODY MASS INDEX: 32.08 KG/M2 | HEART RATE: 76 BPM | TEMPERATURE: 97.4 F
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
## 2017-10-31 DIAGNOSIS — Z98.890 STATUS POST INCISION AND DRAINAGE: Primary | ICD-10-CM

## 2017-10-31 PROCEDURE — 99024 POSTOP FOLLOW-UP VISIT: CPT | Performed by: SURGERY

## 2017-10-31 RX ORDER — MULTIPLE VITAMINS W/ MINERALS TAB 9MG-400MCG
1 TAB ORAL DAILY
COMMUNITY

## 2017-10-31 ASSESSMENT — PAIN SCALES - GENERAL: PAINLEVEL: MILD PAIN (3)

## 2017-10-31 NOTE — PATIENT INSTRUCTIONS
Thank you for allowing Dr. William and our surgical team to participate in your care. Please call with any scheduling questions or concerns to Sandra at [phone] or for nursing questions Candida [phone].  We will see you back in 2 weeks for a f/u , someone will call you to get this scheduled

## 2017-10-31 NOTE — PROGRESS NOTES
"S: POD #11 chainsaw laceration left 3rd and 4th toe.  Washed out in OR, partial closure.  Sent home on antibiotics.  Last dose was yesterday.  Denies fevers, chills, nausea, vomiting, pain.    O:  /74  Pulse 76  Temp 97.4  F (36.3  C) (Tympanic)  Ht 6' 2\" (1.88 m)  Wt 250 lb (113.4 kg)  SpO2 94%  BMI 32.1 kg/m2   Gen: AAOx4, NAD, smiles, shakes my hand upon entering the room  Ext: Left foot, dorsum aspect of 4th toe, pink, swollen, no purulence.  This would is partially open at the most distal portion where there is some fibrinous exudate.  3rd toe is not as pink or as swollen and is closed    A/P  #1 Chainsaw laceration left 3rd and 4th toes    Wound doesn't look great, but there's no obvious infection.  Instructed to wash BID with soapy water.  Avoid soaking.  Will call with increased pain, redness or puss.  Will see back in 1 week.  We might end up having to remove the sutures and pack the wound.  We also discussed the very real possibility of osteomyelitis and possible amputation. I think I would get orthopedics opinion first about that.  "

## 2017-10-31 NOTE — NURSING NOTE
"Chief Complaint   Patient presents with     Surgical Followup     foot laceration with I&D on 10/20/17       Initial /74  Pulse 76  Temp 97.4  F (36.3  C) (Tympanic)  Ht 6' 2\" (1.88 m)  Wt 250 lb (113.4 kg)  SpO2 94%  BMI 32.1 kg/m2 Estimated body mass index is 32.1 kg/(m^2) as calculated from the following:    Height as of this encounter: 6' 2\" (1.88 m).    Weight as of this encounter: 250 lb (113.4 kg).  Medication Reconciliation: complete     Candida Adams      "

## 2017-11-07 ENCOUNTER — OFFICE VISIT (OUTPATIENT)
Dept: SURGERY | Facility: OTHER | Age: 42
End: 2017-11-07
Attending: SURGERY
Payer: COMMERCIAL

## 2017-11-07 VITALS
TEMPERATURE: 98.1 F | HEIGHT: 74 IN | BODY MASS INDEX: 32.08 KG/M2 | WEIGHT: 250 LBS | SYSTOLIC BLOOD PRESSURE: 124 MMHG | DIASTOLIC BLOOD PRESSURE: 74 MMHG | RESPIRATION RATE: 18 BRPM | OXYGEN SATURATION: 95 % | HEART RATE: 75 BPM

## 2017-11-07 DIAGNOSIS — S91.119S: Primary | ICD-10-CM

## 2017-11-07 PROCEDURE — 99024 POSTOP FOLLOW-UP VISIT: CPT | Performed by: SURGERY

## 2017-11-07 ASSESSMENT — PAIN SCALES - GENERAL: PAINLEVEL: MODERATE PAIN (4)

## 2017-11-07 NOTE — MR AVS SNAPSHOT
"              After Visit Summary   11/7/2017    Ricci Lawrence    MRN: [id number]           Patient Information     Date Of Birth          1975        Visit Information        Provider Department      11/7/2017 2:30 PM Rene William, DO [facility]        Today's Diagnoses     Laceration of toe of left foot with complication, sequela    -  1      Care Instructions    Thank you for allowing Dr. William and our surgical team to participate in your care. Please call with any scheduling questions or concerns to Sandra at [phone] or for nursing questions Candida [phone]            Follow-ups after your visit        Who to contact     If you have questions or need follow up information about today's clinic visit or your schedule please contact [facility] directly at [phone].  Normal or non-critical lab and imaging results will be communicated to you by MyChart, letter or phone within 4 business days after the clinic has received the results. If you do not hear from us within 7 days, please contact the clinic through MyChart or phone. If you have a critical or abnormal lab result, we will notify you by phone as soon as possible.  Submit refill requests through [employer] or call your pharmacy and they will forward the refill request to us. Please allow 3 business days for your refill to be completed.          Additional Information About Your Visit        MyChart Information     [employer] lets you send messages to your doctor, view your test results, renew your prescriptions, schedule appointments and more. To sign up, go to [email or web address] . Click on \"Log in\" on the left side of the screen, which will take you to the Welcome page. Then click on \"Sign up Now\" on the right side of the page.     You will be asked to enter the access code listed below, as well as some personal information. Please follow the directions to create your username and password.     Your " "access code is: [email or web address]  Expires: 2018  4:36 PM     Your access code will  in 90 days. If you need help or a new code, please call your [facility] clinic or [phone].        Care EveryWhere ID     This is your Care EveryWhere ID. This could be used by other organizations to access your [facility] medical records  [id number]        Your Vitals Were     Pulse Temperature Respirations Height Pulse Oximetry BMI (Body Mass Index)    75 98.1  F (36.7  C) (Tympanic) 18 6' 2\" (1.88 m) 95% 32.1 kg/m2       Blood Pressure from Last 3 Encounters:   17 124/74   10/31/17 136/74   10/21/17 120/80    Weight from Last 3 Encounters:   17 250 lb (113.4 kg)   10/31/17 250 lb (113.4 kg)   10/20/17 243 lb 8 oz (110.5 kg)              Today, you had the following     No orders found for display       Primary Care Provider Office Phone # Fax #    Efrain Larson [phone]       [facility] [address] [address]        Equal Access to Services     MILAN IRVIN : Hadii geovany mosquera hadasho Soomaali, waaxda luqadaha, qaybta kaalmada adeegyada, kendra curtis. So [facility] [phone].    ATENCIÓN: Si habla español, tiene a winston disposición servicios gratuitos de asistencia lingüística. Llame al [phone].    We comply with applicable federal civil rights laws and [place] laws. We do not discriminate on the basis of race, color, national origin, age, disability, sex, sexual orientation, or gender identity.            Thank you!     Thank you for choosing [facility]  for your care. Our goal is always to provide you with excellent care. Hearing back from our patients is one way we can continue to improve our services. Please take a few minutes to complete the written survey that you may receive in the mail after your visit with us. Thank you!             Your Updated Medication List - Protect others around you: Learn how to safely use, " store and throw away your medicines at www.disposemymeds.org.          This list is accurate as of: 11/7/17  2:51 PM.  Always use your most recent med list.                   Brand Name Dispense Instructions for use Diagnosis    FISH OIL OMEGA-3 PO      Take 2 tablets by mouth daily        IBUPROFEN PO      Take by mouth every 6 hours as needed for moderate pain        Multi-vitamin Tabs tablet      Take 1 tablet by mouth daily        SIMVASTATIN PO      Take 80 mg by mouth daily        TYLENOL PO      Take by mouth every 6 hours as needed for mild pain or fever        VITAMIN D (CHOLECALCIFEROL) PO      Take 2,000 Units by mouth 2 times daily

## 2017-11-07 NOTE — PROGRESS NOTES
"S: last seen on 10/31/17 for wound check.  Left 4th toe was swollen and pink.  Sutures were removed from 3rd toe.  Here for wound check.  Suffered chainsaw laceration on 10/20/17 that partially went into the bone and PIP joint space.  The tendon was repaired, wound washed out and sent home on antibiotics.    O:  /74 (BP Location: Right arm, Patient Position: Chair, Cuff Size: Adult Large)  Pulse 75  Temp 98.1  F (36.7  C) (Tympanic)  Resp 18  Ht 6' 2\" (1.88 m)  Wt 250 lb (113.4 kg)  SpO2 95%  BMI 32.1 kg/m2   Gen: AAOx4, NAD, non toxic appearing  Wound: much less swollen, decreased erythema, no purulence, no fluid collection, no necrosis    A/P  #1 Left 3rd and 4th toe chainsaw laceration    Wound looks much improved.  It will likely be completely healed in 2 weeks.  Return PRN.  "

## 2017-11-07 NOTE — NURSING NOTE
"Chief Complaint   Patient presents with     Surgical Followup     foot laceration       Initial /74 (BP Location: Right arm, Patient Position: Chair, Cuff Size: Adult Large)  Pulse 75  Temp 98.1  F (36.7  C) (Tympanic)  Resp 18  Ht 6' 2\" (1.88 m)  Wt 250 lb (113.4 kg)  SpO2 95%  BMI 32.1 kg/m2 Estimated body mass index is 32.1 kg/(m^2) as calculated from the following:    Height as of this encounter: 6' 2\" (1.88 m).    Weight as of this encounter: 250 lb (113.4 kg).  Medication Reconciliation: complete   Katarina Tubbs    "

## 2017-11-07 NOTE — LETTER
[facility]  [address]  [place] [address]  [phone]          November 7, 2017    RE:  Ricci Lawrence                                                                                                                                                       [address] [facility] [place] [address]            To whom it may concern:    Ricci Lawrence is under my professional care for Surgical issue He  may return to work on 11/15/17 with no work limitations      Sincerely,        Rene William DO

## 2017-11-07 NOTE — PATIENT INSTRUCTIONS
Thank you for allowing Dr. William and our surgical team to participate in your care. Please call with any scheduling questions or concerns to Sandra at [phone] or for nursing questions Candida [phone]

## 2017-11-09 ENCOUNTER — TELEPHONE (OUTPATIENT)
Dept: SURGERY | Facility: OTHER | Age: 42
End: 2017-11-09

## 2017-11-09 NOTE — TELEPHONE ENCOUNTER
Awaiting return call from doctor to verify dates. Writer talked with patient and got a secure fax line to fax new note if ok'd by provider.    Candida Adams

## 2017-11-09 NOTE — TELEPHONE ENCOUNTER
Patient called and would like a call back regarding his return to work letter. Please call patient back at [phone]. Thanks.

## 2017-11-09 NOTE — TELEPHONE ENCOUNTER
1:22 PM    Reason for Call: Phone Call    Description: Pt called and stated he saw Dr William on Tues and was given a return to work note. In appt it was discussed that pt would return to work on 11/13/17, but the date written on note was 11/15/17. Hoping this can be corrected. Pt was hoping to get this emailed to him, but I advised him that I do not know if that can be done. Please call him back at [phone]    Was an appointment offered for this call? No  If yes : Appointment type              Date    Preferred method for responding to this message: Telephone Call  What is your phone number ?    If we cannot reach you directly, may we leave a detailed response at the number you provided? Yes    Can this message wait until your PCP/provider returns, if available today? Not applicable    Deanna Lew

## 2024-04-29 DIAGNOSIS — T14.8XXA CLOSED FRACTURE OF BONE: Primary | ICD-10-CM

## 2024-05-10 ENCOUNTER — ANCILLARY PROCEDURE (OUTPATIENT)
Dept: GENERAL RADIOLOGY | Facility: OTHER | Age: 49
End: 2024-05-10
Attending: PHYSICAL MEDICINE & REHABILITATION
Payer: OTHER GOVERNMENT

## 2024-05-10 DIAGNOSIS — T14.8XXA CLOSED FRACTURE OF BONE: ICD-10-CM

## 2024-05-10 PROCEDURE — 73630 X-RAY EXAM OF FOOT: CPT | Mod: TC | Performed by: RADIOLOGY

## 2025-08-13 ENCOUNTER — ANCILLARY PROCEDURE (OUTPATIENT)
Dept: GENERAL RADIOLOGY | Facility: OTHER | Age: 50
End: 2025-08-13
Attending: PHYSICIAN ASSISTANT
Payer: COMMERCIAL

## 2025-08-13 DIAGNOSIS — M25.511 RIGHT SHOULDER PAIN: ICD-10-CM

## 2025-08-13 PROCEDURE — 73030 X-RAY EXAM OF SHOULDER: CPT | Mod: RT | Performed by: RADIOLOGY

## 2025-08-18 ENCOUNTER — DOCUMENTATION ONLY (OUTPATIENT)
Dept: OTHER | Facility: CLINIC | Age: 50
End: 2025-08-18

## (undated) DEVICE — IRRIGATION-H2O 1000ML

## (undated) DEVICE — DRSG-SPONGE STERILE 4 X 4

## (undated) DEVICE — SPONGE-LAPAROTOMY PADS 18 X 18

## (undated) DEVICE — SCD SLEEVE-KNEE REG.

## (undated) DEVICE — PACK-SET UP-CUSTOM

## (undated) DEVICE — TRAY-SKIN PREP POVIDONE/IODINE

## (undated) DEVICE — LIGHT HANDLE COVER

## (undated) DEVICE — NDL-25G 1-1/2" NON-SAFETY

## (undated) DEVICE — GLV-7.5 ORTHO PROTEXIS PI LF/PF

## (undated) DEVICE — COVER-TABLE SHEET

## (undated) DEVICE — Device

## (undated) DEVICE — APPLICATOR-COTTON 6 INCH STERILE

## (undated) DEVICE — CAUTERY-MEGADYNE TIP

## (undated) DEVICE — SYRINGE-10CC LUER LOCK

## (undated) DEVICE — BDG-CONFORM 2 INCH

## (undated) DEVICE — GOWN-SURG XXL LVL 3 REINFORCED

## (undated) DEVICE — DRAPE-EXTREMITY SHEET

## (undated) DEVICE — SYRINGE-ASEPTO IRRIGATION

## (undated) DEVICE — SUCTION TUBE-YANKAUR

## (undated) DEVICE — TOWEL-OR DISP 4PKS

## (undated) DEVICE — BLADE-SCALPEL #15

## (undated) DEVICE — MARKER-SKIN REG

## (undated) DEVICE — NDL COUNTER-20-40 CT MAGNET/FOAM BLOCK

## (undated) DEVICE — BDG-ELASTIC 3 INCH

## (undated) DEVICE — CAUTERY PAD-POLYHESIVE II ADULT

## (undated) DEVICE — PULSE LAVAGE IRRIGATION SYSTEM

## (undated) DEVICE — SOL-NACL 0.9% 1000ML

## (undated) DEVICE — CANISTER-SUCTION 2000CC

## (undated) DEVICE — TUBING-SUCTION 20FT

## (undated) DEVICE — IRRIGATION-NACL 1000ML

## (undated) DEVICE — GOWN-SURG XL LVL 3 REINFORCED

## (undated) DEVICE — DRSG-KERLIX ROLL 4.5 X 4.1YD

## (undated) DEVICE — CAUTERY PENCIL